# Patient Record
Sex: MALE | Race: WHITE | NOT HISPANIC OR LATINO | Employment: FULL TIME | URBAN - METROPOLITAN AREA
[De-identification: names, ages, dates, MRNs, and addresses within clinical notes are randomized per-mention and may not be internally consistent; named-entity substitution may affect disease eponyms.]

---

## 2017-02-15 ENCOUNTER — ALLSCRIPTS OFFICE VISIT (OUTPATIENT)
Dept: OTHER | Facility: OTHER | Age: 57
End: 2017-02-15

## 2017-02-15 DIAGNOSIS — I83.90 ASYMPTOMATIC VARICOSE VEINS OF LOWER EXTREMITY: ICD-10-CM

## 2017-02-15 DIAGNOSIS — R25.2 CRAMP AND SPASM: ICD-10-CM

## 2017-02-21 ENCOUNTER — HOSPITAL ENCOUNTER (OUTPATIENT)
Dept: RADIOLOGY | Facility: HOSPITAL | Age: 57
Discharge: HOME | End: 2017-02-21
Attending: FAMILY MEDICINE
Payer: COMMERCIAL

## 2017-02-21 DIAGNOSIS — R25.2 CRAMP AND SPASM: ICD-10-CM

## 2017-02-21 DIAGNOSIS — I83.90 ASYMPTOMATIC VARICOSE VEINS OF LOWER EXTREMITY: ICD-10-CM

## 2017-02-21 PROCEDURE — 93970 EXTREMITY STUDY: CPT

## 2017-02-23 ENCOUNTER — GENERIC CONVERSION - ENCOUNTER (OUTPATIENT)
Dept: OTHER | Facility: OTHER | Age: 57
End: 2017-02-23

## 2017-02-28 ENCOUNTER — ALLSCRIPTS OFFICE VISIT (OUTPATIENT)
Dept: OTHER | Facility: OTHER | Age: 57
End: 2017-02-28

## 2017-03-23 ENCOUNTER — ALLSCRIPTS OFFICE VISIT (OUTPATIENT)
Dept: OTHER | Facility: OTHER | Age: 57
End: 2017-03-23

## 2017-03-23 ENCOUNTER — TRANSCRIBE ORDERS (OUTPATIENT)
Dept: ADMINISTRATIVE | Facility: HOSPITAL | Age: 57
End: 2017-03-23

## 2017-03-23 DIAGNOSIS — R07.89 OTHER CHEST PAIN: ICD-10-CM

## 2017-03-23 DIAGNOSIS — R07.89 OTHER CHEST PAIN: Primary | ICD-10-CM

## 2017-04-04 ENCOUNTER — HOSPITAL ENCOUNTER (OUTPATIENT)
Dept: NON INVASIVE DIAGNOSTICS | Facility: HOSPITAL | Age: 57
Discharge: HOME | End: 2017-04-04
Attending: INTERNAL MEDICINE
Payer: COMMERCIAL

## 2017-04-04 ENCOUNTER — GENERIC CONVERSION - ENCOUNTER (OUTPATIENT)
Dept: OTHER | Facility: OTHER | Age: 57
End: 2017-04-04

## 2017-04-04 DIAGNOSIS — R07.89 OTHER CHEST PAIN: ICD-10-CM

## 2017-04-04 PROCEDURE — 93350 STRESS TTE ONLY: CPT

## 2017-04-05 LAB
MAX DIASTOLIC BP: 80 MMHG
MAX HEART RATE: 153 BPM
MAX PREDICTED HEART RATE: 165 BPM
MAX. SYSTOLIC BP: 160 MMHG
PROTOCOL NAME: NORMAL
TIME IN EXERCISE PHASE: 660 S

## 2017-05-08 LAB
A/G RATIO (HISTORICAL): 1.8 (ref 1.2–2.2)
ALBUMIN SERPL BCP-MCNC: 4.2 G/DL (ref 3.5–5.5)
ALP SERPL-CCNC: 59 IU/L (ref 39–117)
ALT SERPL W P-5'-P-CCNC: 21 IU/L (ref 0–44)
AST SERPL W P-5'-P-CCNC: 19 IU/L (ref 0–40)
BILIRUB SERPL-MCNC: 0.3 MG/DL (ref 0–1.2)
BUN SERPL-MCNC: 20 MG/DL (ref 6–24)
BUN/CREA RATIO (HISTORICAL): 18 (ref 9–20)
CALCIUM SERPL-MCNC: 9.3 MG/DL (ref 8.7–10.2)
CHLORIDE SERPL-SCNC: 101 MMOL/L (ref 96–106)
CHOLEST SERPL-MCNC: 163 MG/DL (ref 100–199)
CHOLEST/HDLC SERPL: 2.9 RATIO UNITS (ref 0–5)
CO2 SERPL-SCNC: 26 MMOL/L (ref 18–29)
CREAT SERPL-MCNC: 1.11 MG/DL (ref 0.76–1.27)
EGFR AFRICAN AMERICAN (HISTORICAL): 86 ML/MIN/1.73
EGFR-AMERICAN CALC (HISTORICAL): 74 ML/MIN/1.73
GLUCOSE SERPL-MCNC: 98 MG/DL (ref 65–99)
HDLC SERPL-MCNC: 56 MG/DL
LDLC SERPL CALC-MCNC: 87 MG/DL (ref 0–99)
POTASSIUM SERPL-SCNC: 4.9 MMOL/L (ref 3.5–5.2)
SODIUM SERPL-SCNC: 141 MMOL/L (ref 134–144)
TOT. GLOBULIN, SERUM (HISTORICAL): 2.3 G/DL (ref 1.5–4.5)
TOTAL PROTEIN (HISTORICAL): 6.5 G/DL (ref 6–8.5)
TRIGL SERPL-MCNC: 99 MG/DL (ref 0–149)
VLDLC SERPL CALC-MCNC: 20 MG/DL (ref 5–40)

## 2017-05-17 ENCOUNTER — ALLSCRIPTS OFFICE VISIT (OUTPATIENT)
Dept: OTHER | Facility: OTHER | Age: 57
End: 2017-05-17

## 2018-01-11 NOTE — PROGRESS NOTES
Assessment    1  Varicose veins of leg with pain, bilateral (454 8) (I83 813)   2  Chronic venous insufficiency (459 81) (I87 2)    Plan    1  Gradient compression stocking, below knee, 20-30mm Hg, each; Status:Complete;     Done: [id number]   2  Follow-up visit in 3 months Evaluation and Treatment  Follow-up  Status: Hold For -   Scheduling  Requested for: [id number]   3  Keep your leg elevated whenever you can to decrease swelling and increase circulation ;   Status:Complete;   Done: 11HJD2496   4  Support stockings can help keep the blood from pooling in the small veins in your feet   and legs ; Status:Complete;   Done: 50JFZ9490    Discussion/Summary  Discussion Summary:   Symptomatic varicose veins more severe on the left as compared to the right  We discussed at length the findings on recent reflux study as well as the general pathophysiology of venous disease to include venous hypertension and its long-term sequela  We discussed the treatment options available to include conservative management with compression, elevation, exercise and maintenance of a healthy weight  We also discussed the option of further evaluation and potential treatment in light of the combination of both deep and superficial venous insufficiency  At this point we will proceed with an initial conservative treatment regimen with follow-up evaluation in 3 months  He's been given a prescription for 20-30 mmHg knee-high compressive stockings  Chief Complaint  Chief Complaint Free Text Note Form: " I am here to have my veins looked at " [facility] 2/21/2017  Laura Fairchild is here today to have his varicose veins evaluated  PT states he has bilateral bulging veins , he states he has cramping in his calfs when resting at night  PT denies any swelling & achiness   PT denies any history of blood clots  PT does not wear compression stocking  PT is not on any blood thinners  PT is a nonsmoker        History of Present Illness  Free Text HPI: 15-year-old resents with bilateral lower extremity varicosities  He notes discomfort in both lower extremities which she describes mostly as a cramping sensation which occurs most frequently in his calves but also often in the medial aspect of both thighs  He notes this mostly at night  He spends the majority of his days on his feet  He does not utilize compressive stockings  He denies a history of deep or superficial venous thrombosis  PT states he has bilateral bulging veins , he states he has cramping in his calfs when resting at night  PT denies any swelling & achiness   PT denies any history of blood clots  PT does not wear compression stocking  PT is not on any blood thinners  PT is a nonsmoker  Review of Systems  Complete Male - Vasc:   Constitutional: No fever or chills, feels well, no tiredness, no recent weight gain or weight loss  Eyes: No sudden vision loss, no blurred vision, no double vision  ENT: no loss of hearing, no nosebleeds, no hoarseness  Cardiovascular: no chest pain, regular heart rate  Respiratory: No sob, no wheezing, no cough, no sob with exertion, no orthopnea  Gastrointestinal: No nausea, No vomiting, no diarrhea, no blood in stool  Genitourinary: no dysuria, no hematuria, No urinary incontinence, no erectile dysfunction  Musculoskeletal: no limb pain, no limb swelling  Integumentary: no rash, no lesions, no wounds, no ulcer  Neurological: no dementia, no headache, no numbness, no limb weakness, no dizziness, no difficulty walking  Psychiatric: no depression, no mood disorders, no anxiety  Hematologic/Lymphatic: no bleeding disorder, no easy bruising  ROS Reviewed:   ROS reviewed  Active Problems    1  Abdominal bloating (787 3) (R14 0)   2  Benign colon polyp (211 3) (K63 5)   3  BMI 29 0-29 9,adult (V85 25) (Z68 29)   4  Cervical radiculopathy (723 4) (M54 12)   5  Cramp of both lower extremities (729 82) (R25 2)   6  Internal hemorrhoids (455 0) (K64 8)   7  Lateral epicondylitis of right elbow (726 32) (M77 11)   8  Male erectile dysfunction, unspecified (607 84) (N52 9)   9  Muscle cramps (729 82) (R25 2)   10  Nonallopathic lesion (739 9) (M99 9)   11  Presbycusis (388 01) (H91 10)   12  Radiculopathy (729 2) (M54 10)   13  Spondylosis of cervical region without myelopathy or radiculopathy (721 0) (M47 812)   14  Tick bite (919 4,E906 4) (W57 XXXA)   15  Tinnitus (388 30) (H93 19)   16  Varicosities of leg (454 9) (I83 90)    Past Medical History    1  History of _   2  History of _   3  Cellulitis (682 9) (L03 90)   4  History of acute bronchitis (V12 69) (Z87 09)   5  History of basal cell carcinoma (V10 83) (Z85 828)   6  History of Lateral epicondylitis, unspecified laterality (726 32) (M77 10)   7  History of Tendonitis Peroneal (726 79)   8  History of Well adult on routine health check (V70 0) (Z00 00)  Active Problems And Past Medical History Reviewed: The active problems and past medical history were reviewed and updated today  Surgical History  Surgical History Reviewed: The surgical history was reviewed and updated today  Family History  Father    1  Family history of    2  Family history of Lung Cancer (V16 1)   3  Family history of Nicotine abuse  Maternal Grandfather    4  Family history of Acute Myocardial Infarction (V17 3)  Family History Reviewed: The family history was reviewed and updated today  Social History    · Alcohol Use (History)   · Daily Coffee Consumption (___ Cups/Day)   · Never A Smoker  Social History Reviewed: The social history was reviewed and updated today  Current Meds   1  Aleve 220 MG Oral Tablet; Take 1 tablet 3 times daily as needed Recorded   2  Viagra 100 MG Oral Tablet; TAKE 1 TABLET ONE HOUR PRIOR TO SEXUAL ACTIVITY; Therapy: 60FHR3373 to (Last Rx:[id number])  Requested for: 23RYN4260 Ordered  Medication List Reviewed: The medication list was reviewed and updated today  Allergies    1  No Known Drug Allergies    Vitals  Vital Signs    Recorded: 59Fka6894 03:03PM   Heart Rate 80, R Radial   Pulse Quality Normal, R Radial   Respiration Quality Normal   Respiration 18   Systolic 003, RUE, Sitting   Diastolic 70, RUE, Sitting   Height 6 ft    Weight 216 lb    BMI Calculated 29 3   BSA Calculated 2 2     Results/Data  Diagnostic Studies Reviewed Vasc:   Vascular Studies Reviewed: Venous reflux study from 2/21/17 with evidence of both deep and superficial venous incompetency  Physical Exam    Dorsalis pedis: right 2+ and left 2+  Distal Pulse Exam: Normal Capillary Refill  Extremities: bilateral ankle pitting edema, no pretibial edema and no foot edema  LE Varicose Veins: right leg 4-8+ truncal veins, left leg 4-8 millimeters+ truncal veins, no right leg reticular veins, no left leg reticular veins, right leg spider veins and left leg spider veins  Psychiatric   Orientation to person, place and time: Normal    Mood and affect: Normal    Neurologic   Motor skills intact  Musculoskeletal   Gait and station: Normal    Muscle strength/tone: Normal    Skin   Skin and subcutaneous tissue: Normal without rashes or lesions  Palpation of skin and subcutaneous tissue: Normal turgor     Venous Disease: No lipodermatosclerosis, stasis dermatitis, hyperpigmentation, or atrophie dillan noted on exam       Signatures   Electronically signed by : Melisa Oshea MD; Feb 28 2017  3:50PM EST                       (Author)

## 2018-01-12 VITALS
RESPIRATION RATE: 18 BRPM | HEART RATE: 80 BPM | WEIGHT: 216 LBS | HEIGHT: 72 IN | BODY MASS INDEX: 29.26 KG/M2 | DIASTOLIC BLOOD PRESSURE: 70 MMHG | SYSTOLIC BLOOD PRESSURE: 136 MMHG

## 2018-01-12 VITALS
DIASTOLIC BLOOD PRESSURE: 76 MMHG | HEART RATE: 82 BPM | WEIGHT: 217 LBS | RESPIRATION RATE: 16 BRPM | SYSTOLIC BLOOD PRESSURE: 130 MMHG | HEIGHT: 72 IN | BODY MASS INDEX: 29.39 KG/M2 | TEMPERATURE: 96 F

## 2018-01-12 VITALS
DIASTOLIC BLOOD PRESSURE: 80 MMHG | SYSTOLIC BLOOD PRESSURE: 124 MMHG | HEART RATE: 64 BPM | WEIGHT: 214.19 LBS | BODY MASS INDEX: 29.01 KG/M2 | HEIGHT: 72 IN | OXYGEN SATURATION: 99 %

## 2018-01-13 VITALS
HEIGHT: 72 IN | SYSTOLIC BLOOD PRESSURE: 116 MMHG | DIASTOLIC BLOOD PRESSURE: 70 MMHG | BODY MASS INDEX: 28.46 KG/M2 | WEIGHT: 210.13 LBS | HEART RATE: 60 BPM | OXYGEN SATURATION: 97 %

## 2018-01-18 NOTE — RESULT NOTES
Verified Results  VAS REFLUX LOWER LIMB    [id number] 09:42AM Melissa Marquez Order Number: [id number]    - Patient Instructions: To schedule this appointment, please contact Central Scheduling at [phone]  Test Name Result Flag Reference   VAS LOWER LIMB VENOUS DUPLEX STUDY WITH REFLUX ASSESSMENT, COMPLETE BILATERAL (Report)     [facility] REPORT   CLINICAL:   Indications: I83 90 Asymptomatic varicose veins of unspecified lower extremity   The patient has history of malignancy  He has no history of hypercoagulable   state, CAD, DVT or limb trauma  The patient current BMI is 30 85, Weight is 215   lb and Height is 70 in  Clinical: Bilateral varicose vein with symptoms        FINDINGS:      Right      Impression    AP(mm) Valve  Reflux Time    GSV Inguinal            12 0 Reflux   6 39785    GSV Prox Thigh           7 0 Reflux   5 97823    GSV Mid Thigh            5 0 Reflux   2 75651    GSV Dist Thigh           8 0 Reflux   2 74799    CFV       Normal (Patent)     Reflux   5 66691    GSV Knee              6 0 Reflux   2 39192    FV Prox                 Reflux   5 69197    GSV Prox Calf            6 0 Reflux   1 13823    GSV Mid Calf            3 0 Reflux   3 57973    GSV Dist Calf            4 0               PFV                   Reflux   4 83729    SSV Mid Calf            4 0 Reflux   4 13958    SSV Knee              2 0 Reflux   2 44746    SSV Ankle              2 0                  Left      Impression    AP(mm) Valve  Reflux Time    GSV Inguinal            9 0 Reflux   4 58096    GSV Prox Thigh           12 0 Reflux   6 83721    GSV Mid Thigh            5 0 Reflux   4 67488    GSV Dist Thigh           5 0 Reflux   3 46888    CFV       Normal (Patent)     Reflux   5 77036    GSV Knee              5 0 Reflux   3 85613    FV Prox                 Reflux   5 85983    GSV Prox Calf            4 0 Reflux   2 71555    GSV Mid Calf            3 0 Reflux   1 43541    GSV Dist Calf 3 0               GSV Ankle              3 0               Popliteal                Reflux   5 75427    SSV Mid Calf            2 0               SSV Knee              2 0               SSV Ankle              2 0                        CONCLUSION:      Impression:   RIGHT LIMB: Abnormal   Deep venous incompetence is noted t the common femoral vein with a valve   closure time (VCT) of 5 3 sec , femoral vein VCT 5 8 sec , deep femoral vein VCT   4 7 sec  The great saphenous vein is incompetent at the saphenofemoral junction with a   valve closure time of 6 seconds, proximal thigh with a VCT of 5 4 seconds, mid   thigh with a VCT of 2 1 seconds, and mid calf with a VCT of 3 4 seconds  The great saphenous vein leaves the fascial borders in the mid thigh and   measures 12 mm at the saphenofemoral junction, 7 mm proximal thigh, 5 mm mid   thigh, 8 mm distal thigh, 6 mm at the knee, and 6 mm BK  The small saphenous vein is incompetent and doesn't communicates with the   popliteal vein  small saphenous VCT proximal 2 9sec, mid VCT 4 9 sec  There is no evidence of incompetent perforators in the thigh or calf  There is no evidence of deep vein thrombosis in the CFV, the proximal PFV, the   femoral vein and the popliteal vein  LEFT LIMB: Abnormal   Deep venous incompetence is noted at the common femoral vein with a valve   closure time (VCT) of 5 7 sec, femoral vein VCT 5 8 sec , popliteal vein   VCT5 3sec  The great saphenous vein is incompetent at the saphenofemoral junction with a   valve closure time of 4 1 seconds, proximal thigh with a VCT of 6 5 seconds, mid   thigh with a VCT of 4 1 seconds, and mid calf with a VCT of 1 1 seconds  The great saphenous vein leaves the fascial borders in the mid thigh and   measures 9 mm at the saphenofemoral junction, 12 mm proximal thigh,5 mm mid   thigh,5 mm distal thigh, 5 mm at the knee, and 4 mm BK     The small saphenous vein is competent and doesn't communicates with the   popliteal vein  There is no evidence of deep vein thrombosis in the CFV, the proximal PFV, the   femoral vein and the popliteal vein  Study performed with patient in standing  SIGNATURE:   Electronically Signed by: Vincent Reddy on 2017-02-22 09:50:40 PM       Discussion/Summary   please call pt vasc study shows venous insuf b/l  [facility] - dr Walter Ceja - at appt    i would see him, will most probably need compression stockings

## 2018-03-09 ENCOUNTER — OFFICE VISIT (OUTPATIENT)
Dept: VASCULAR SURGERY | Facility: CLINIC | Age: 58
End: 2018-03-09
Payer: COMMERCIAL

## 2018-03-09 VITALS
DIASTOLIC BLOOD PRESSURE: 82 MMHG | SYSTOLIC BLOOD PRESSURE: 138 MMHG | TEMPERATURE: 97 F | BODY MASS INDEX: 28.44 KG/M2 | HEIGHT: 72 IN | RESPIRATION RATE: 16 BRPM | WEIGHT: 210 LBS | HEART RATE: 72 BPM

## 2018-03-09 DIAGNOSIS — I83.813 VARICOSE VEINS OF LEG WITH PAIN, BILATERAL: Primary | ICD-10-CM

## 2018-03-09 DIAGNOSIS — I87.2 CHRONIC VENOUS INSUFFICIENCY: ICD-10-CM

## 2018-03-09 PROCEDURE — 99213 OFFICE O/P EST LOW 20 MIN: CPT | Performed by: NURSE PRACTITIONER

## 2018-03-09 RX ORDER — NAPROXEN SODIUM 220 MG
1 TABLET ORAL 3 TIMES DAILY PRN
COMMUNITY

## 2018-03-09 RX ORDER — SILDENAFIL 100 MG/1
1 TABLET, FILM COATED ORAL AS NEEDED
COMMUNITY
Start: 2016-05-05 | End: 2021-12-06

## 2018-03-09 NOTE — PATIENT INSTRUCTIONS
Varicose veins bilateral legs with occasional right thigh cramping at night  -Your varicose veins appear asymptomatic at this time  You have no swelling, heaviness, aching, itching, or burning  However, the right thigh cramping you get intermittently could be a result of your varicose veins/chronic venous insufficiency  -I recommend that you wear your compression stockings on a daily basis or at least while you are at work    If your cramping is related to your varicose veins there should be some improvement in symptoms  -If your symptoms worsen or you develop swelling or chronic pain notify the office, otherwise we will be happy to see you on an as needed basis

## 2018-03-09 NOTE — PROGRESS NOTES
Assessment/Plan:    64 y/o healthy appearing M returns to office, at request of wife, for recheck of varicose veins  He has bilateral lower extremity varicose veins  Deep and superficial incompetence noted on reflux study in Feb 2017  He denies any edema or chronic pain  He is experiencing occasional cramping to the right thigh at night  Unclear if this is solely related to his varicose veins  He wears compression stockings intermittently, but has not noted any impact on symptoms  He has no skin changes, chronic pain or edema  I have advised more regular use of compression stockings  We also discussed the benefits of periodic elevation, regular physical activity and weight management  If his symptoms worsen or he develops new pain or edema he should notify the office, otherwise we will be happy to see him on an as needed basis  Diagnoses and all orders for this visit:    Varicose veins of leg with pain, bilateral    Chronic venous insufficiency    Other orders  -     naproxen sodium (ALEVE) 220 MG tablet; Take 1 tablet by mouth 3 (three) times a day as needed  -     sildenafil (VIAGRA) 100 mg tablet; Take 1 tablet by mouth          Subjective:      Patient ID: Loren Alvarado is a 65 y o  male  This patient returns to office for recheck of varicose veins  He states that his legs feel ok, though he does get occasional cramping only to the right thigh at night, but his wife wanted him to be checked  He has bilateral lower extremity varicose veins  LEVDR 2/2017 with note of both deep and superficial incompetence  No skin changes  Denies any achiness or swelling to the legs  Only complaint is of cramping at night that occurs only to the right thigh  He states that since his last visit he has started taking multi-vitamins and drinking more water  He wore compression socks regularly for about 3 months, but now not so frequently           The following portions of the patient's history were reviewed and updated as appropriate: allergies, current medications, past family history, past medical history, past social history, past surgical history and problem list     Review of Systems   Constitutional: Negative  HENT: Negative  Eyes: Negative  Respiratory: Negative  Cardiovascular: Positive for leg swelling  Endocrine: Negative  Genitourinary: Negative  Musculoskeletal: Negative  Skin: Negative  Allergic/Immunologic: Negative  Hematological: Negative  Psychiatric/Behavioral: Negative  Objective:     Physical Exam   Constitutional: He appears well-nourished  No distress  HENT:   Head: Normocephalic and atraumatic  Eyes: Conjunctivae are normal    Neck: Neck supple  No JVD present  Cardiovascular: Normal rate and regular rhythm  Pulses:       Dorsalis pedis pulses are 2+ on the right side, and 2+ on the left side  Posterior tibial pulses are 2+ on the right side, and 2+ on the left side  Truncal varicosities bilateral lower extremities, more pronounced on right   Pulmonary/Chest: Effort normal  No respiratory distress  Musculoskeletal: Normal range of motion  He exhibits no edema  Neurological: He is alert  Skin: Skin is warm and dry  Psychiatric: He has a normal mood and affect           Vitals:    03/09/18 1020   BP: 138/82   BP Location: Left arm   Patient Position: Sitting   Cuff Size: Adult   Pulse: 72   Resp: 16   Temp: (!) 97 °F (36 1 °C)   TempSrc: Tympanic   Weight: 95 3 kg (210 lb)   Height: 6' (1 829 m)       Patient Active Problem List   Diagnosis    Varicose veins of leg with pain, bilateral    Chronic venous insufficiency       Past Surgical History:   Procedure Laterality Date    SKIN BIOPSY         Family History   Problem Relation Age of Onset    Lung cancer Father     Heart attack Maternal Grandfather        Social History     Social History    Marital status: /Civil Union     Spouse name: N/A    Number of children: N/A  Years of education: N/A     Occupational History    Not on file       Social History Main Topics    Smoking status: Never Smoker    Smokeless tobacco: Never Used    Alcohol use Not on file    Drug use: Unknown    Sexual activity: Not on file     Other Topics Concern    Not on file     Social History Narrative    No narrative on file       No Known Allergies      Current Outpatient Prescriptions:     naproxen sodium (ALEVE) 220 MG tablet, Take 1 tablet by mouth 3 (three) times a day as needed, Disp: , Rfl:     sildenafil (VIAGRA) 100 mg tablet, Take 1 tablet by mouth, Disp: , Rfl:

## 2018-11-06 ENCOUNTER — OFFICE VISIT (OUTPATIENT)
Dept: FAMILY MEDICINE CLINIC | Facility: CLINIC | Age: 58
End: 2018-11-06
Payer: COMMERCIAL

## 2018-11-06 VITALS
WEIGHT: 209 LBS | HEART RATE: 72 BPM | RESPIRATION RATE: 16 BRPM | DIASTOLIC BLOOD PRESSURE: 84 MMHG | BODY MASS INDEX: 28.31 KG/M2 | HEIGHT: 72 IN | SYSTOLIC BLOOD PRESSURE: 130 MMHG | TEMPERATURE: 96.2 F

## 2018-11-06 DIAGNOSIS — K40.91 RECURRENT LEFT INGUINAL HERNIA: Primary | ICD-10-CM

## 2018-11-06 PROCEDURE — 99213 OFFICE O/P EST LOW 20 MIN: CPT | Performed by: FAMILY MEDICINE

## 2018-11-06 PROCEDURE — 3008F BODY MASS INDEX DOCD: CPT | Performed by: FAMILY MEDICINE

## 2018-11-06 PROCEDURE — 1036F TOBACCO NON-USER: CPT | Performed by: FAMILY MEDICINE

## 2018-11-06 NOTE — PROGRESS NOTES
Assessment/Plan:    Problem List Items Addressed This Visit     None      Visit Diagnoses     Recurrent left inguinal hernia    -  Primary    Relevant Orders    Ambulatory referral to General Surgery          There are no Patient Instructions on file for this visit  No Follow-up on file  Subjective:      Patient ID: Charline Avelar is a 63 y o  male  Chief Complaint   Patient presents with    lump     groin area, noticed 3 weeks ago        Pt states he has a lump in his lower abd, states he noticed it three weeks ago  May have been there longer  States yesterday it was large over weekend it was down to nothing  No pain  Pt was constipated one day  No diarrhea        The following portions of the patient's history were reviewed and updated as appropriate: allergies, current medications, past family history, past medical history, past social history, past surgical history and problem list     Review of Systems   Constitutional: Negative for activity change, appetite change, chills, diaphoresis, fatigue, fever and unexpected weight change  HENT: Negative for congestion, dental problem, ear pain, mouth sores, sinus pain, sinus pressure, sore throat and trouble swallowing  Eyes: Negative for photophobia, discharge and itching  Respiratory: Negative for apnea, chest tightness and shortness of breath  Cardiovascular: Negative for chest pain, palpitations and leg swelling  Gastrointestinal: Negative for abdominal distention, abdominal pain, blood in stool, nausea and vomiting  Swollen left inguinal area   Endocrine: Negative for cold intolerance, heat intolerance, polydipsia, polyphagia and polyuria  Genitourinary: Negative for difficulty urinating  Musculoskeletal: Negative for arthralgias  Skin: Negative for color change and wound  Neurological: Negative for dizziness, syncope, speech difficulty and headaches  Hematological: Negative for adenopathy     Psychiatric/Behavioral: Negative for agitation and behavioral problems  Current Outpatient Prescriptions   Medication Sig Dispense Refill    naproxen sodium (ALEVE) 220 MG tablet Take 1 tablet by mouth 3 (three) times a day as needed      sildenafil (VIAGRA) 100 mg tablet Take 1 tablet by mouth       No current facility-administered medications for this visit  Objective:    /84   Pulse 72   Temp (!) 96 2 °F (35 7 °C)   Resp 16   Ht 6' (1 829 m)   Wt 94 8 kg (209 lb)   BMI 28 35 kg/m²        Physical Exam   Constitutional: He appears well-developed and well-nourished  No distress  HENT:   Head: Normocephalic and atraumatic  Right Ear: External ear normal    Left Ear: External ear normal    Nose: Nose normal    Mouth/Throat: Oropharynx is clear and moist  No oropharyngeal exudate  Eyes: Pupils are equal, round, and reactive to light  EOM are normal  Right eye exhibits no discharge  Left eye exhibits no discharge  No scleral icterus  Neck: No thyromegaly present  Cardiovascular: Normal rate and normal heart sounds  No murmur heard  Pulmonary/Chest: Effort normal and breath sounds normal  No respiratory distress  He has no wheezes  Abdominal: Soft  Bowel sounds are normal  He exhibits no distension and no mass  There is no tenderness  There is no rebound and no guarding  Musculoskeletal: Normal range of motion  Neurological: He is alert  He displays normal reflexes  Coordination normal    Skin: Skin is warm and dry  No rash noted  He is not diaphoretic  No erythema  Psychiatric: He has a normal mood and affect  His behavior is normal    Nursing note and vitals reviewed               Elly Suárez DO

## 2019-03-27 ENCOUNTER — PREP FOR PROCEDURE (OUTPATIENT)
Dept: SURGERY | Facility: HOSPITAL | Age: 59
End: 2019-03-27

## 2019-03-27 ENCOUNTER — OFFICE VISIT (OUTPATIENT)
Dept: SURGERY | Facility: CLINIC | Age: 59
End: 2019-03-27
Payer: COMMERCIAL

## 2019-03-27 ENCOUNTER — TELEPHONE (OUTPATIENT)
Dept: SURGERY | Facility: HOSPITAL | Age: 59
End: 2019-03-27

## 2019-03-27 VITALS
BODY MASS INDEX: 28.44 KG/M2 | WEIGHT: 210 LBS | HEIGHT: 72 IN | SYSTOLIC BLOOD PRESSURE: 130 MMHG | DIASTOLIC BLOOD PRESSURE: 82 MMHG

## 2019-03-27 DIAGNOSIS — I83.813 VARICOSE VEINS OF LEG WITH PAIN, BILATERAL: ICD-10-CM

## 2019-03-27 DIAGNOSIS — K40.90 UNILATERAL INGUINAL HERNIA WITHOUT OBSTRUCTION OR GANGRENE, RECURRENCE NOT SPECIFIED: Primary | ICD-10-CM

## 2019-03-27 DIAGNOSIS — K40.90 LEFT GROIN HERNIA: Primary | ICD-10-CM

## 2019-03-27 DIAGNOSIS — R10.30 LOWER ABDOMINAL PAIN: ICD-10-CM

## 2019-03-27 DIAGNOSIS — K59.04 CHRONIC IDIOPATHIC CONSTIPATION: ICD-10-CM

## 2019-03-27 PROCEDURE — 99214 OFFICE O/P EST MOD 30 MIN: CPT | Performed by: SPECIALIST

## 2019-03-27 NOTE — LETTER
March 27, 2019     Thien Fermin DO  [address] [address]    Patient: Andrei Rabago   YOB: 1960   Date of Visit: 3/27/2019       Dear Dr Ritika Duong:    Thank you for referring Andrei Rabago to me for evaluation  Below are my notes for this consultation  If you have questions, please do not hesitate to call me  I look forward to following your patient along with you  Sincerely,        Sofía Chavez MD        CC: No Recipients  Sofía Chavez MD  3/27/2019  9:19 AM  Signed  [facility] Pre Procedure/ Admission History and Physical Note:  Andrei Rabago 63 y o  male MRN: [id number]  Encounter: [id number] PCP: Thien Fermin DO      Assessment/Plan:    Left groin hernia  Left groin hernia with some pain and constipation    Chronic idiopathic constipation  Colonoscopy negative  For hernia surgery    Lower abdominal pain  Advice hernia surgery Risk and benefits were explained       Diagnoses and all orders for this visit:    Left groin hernia    Chronic idiopathic constipation    Varicose veins of leg with pain, bilateral    Lower abdominal pain          Subjective:      Patient ID: Andrei Rabago is a 63 y o  male      HPI    left groin pain and the swelling which has increased in size patient works as  and is causing him problems now as it is increasing in size  Referred to us for hernia surgery  Patient's history of chronic constipation as all the workup and colonoscopy done which was unremarkable not on any laxative bowel movements have reached 2nd 3rd day some straining which may have caused the hernia  No bleeding no cough no expectoration nonsmoker no history of any urinary complaint    The following portions of the patient's history were reviewed and updated as appropriate: He  has a past medical history of Basal cell carcinoma, Cervical radiculopathy, Lateral epicondylitis (05/04/2005), Peroneal tendonitis (10/29/2007), Radiculopathy, and Varicosities of leg  He   Patient Active Problem List    Diagnosis Date Noted    Left groin hernia 03/27/2019    Chronic idiopathic constipation 03/27/2019    Lower abdominal pain 03/27/2019    Varicose veins of leg with pain, bilateral 02/28/2017    Chronic venous insufficiency 02/28/2017     He  has a past surgical history that includes Skin biopsy and Colonoscopy  His family history includes Heart attack in his maternal grandfather; Hypertension in his father; Lung cancer in his father; Other in his father  He  reports that he has never smoked  He has never used smokeless tobacco  He reports that he drinks alcohol  His drug history is not on file  Current Outpatient Medications   Medication Sig Dispense Refill    naproxen sodium (ALEVE) 220 MG tablet Take 1 tablet by mouth 3 (three) times a day as needed      sildenafil (VIAGRA) 100 mg tablet Take 1 tablet by mouth       No current facility-administered medications for this visit  Current Outpatient Medications on File Prior to Visit   Medication Sig    naproxen sodium (ALEVE) 220 MG tablet Take 1 tablet by mouth 3 (three) times a day as needed    sildenafil (VIAGRA) 100 mg tablet Take 1 tablet by mouth     No current facility-administered medications on file prior to visit  He has No Known Allergies       Review of Systems   Constitutional: Negative  HENT: Negative for congestion, dental problem and drooling  Eyes: Negative for discharge and itching  Respiratory: Negative  Negative for apnea, cough, choking, chest tightness, shortness of breath, wheezing and stridor  Cardiovascular: Negative  Gastrointestinal: Positive for constipation  Negative for abdominal distention, abdominal pain, anal bleeding, blood in stool, diarrhea, nausea, rectal pain and vomiting          Groin swelling which is increasing in size and some groin discomfort left groin   Endocrine: Negative for cold intolerance, heat intolerance, polydipsia, polyphagia and polyuria  Genitourinary: Negative for difficulty urinating, dysuria, enuresis, flank pain, frequency, genital sores and scrotal swelling  Musculoskeletal: Negative for arthralgias, back pain, gait problem, joint swelling and myalgias  Allergic/Immunologic: Negative for environmental allergies  Neurological: Negative for dizziness, facial asymmetry, light-headedness and headaches  Hematological: Negative for adenopathy  Does not bruise/bleed easily  Psychiatric/Behavioral: Negative for agitation, behavioral problems and confusion  Objective:    /82 (BP Location: Left arm, Patient Position: Sitting)   Ht 6' (1 829 m)   Wt 95 3 kg (210 lb)   BMI 28 48 kg/m²       Physical Exam   Constitutional: He is oriented to person, place, and time  He appears well-developed and well-nourished  HENT:   Head: Normocephalic and atraumatic  Eyes: Pupils are equal, round, and reactive to light  Conjunctivae and EOM are normal    Neck: Normal range of motion  Neck supple  No JVD present  No tracheal deviation present  No thyromegaly present  Cardiovascular: Normal rate, regular rhythm, normal heart sounds and intact distal pulses  Pulmonary/Chest: Effort normal and breath sounds normal    Abdominal: Soft  Bowel sounds are normal  A hernia is present  Left groin swelling partially reducible some pain otherwise nontender   Musculoskeletal: Normal range of motion  He exhibits no edema or deformity  Lymphadenopathy:     He has no cervical adenopathy  Neurological: He is alert and oriented to person, place, and time  Skin: Skin is warm and dry  Psychiatric: He has a normal mood and affect  His behavior is normal  Judgment and thought content normal    Vitals reviewed  Pertinent labs reviewed  Most Recent Labs:   No visits with results within 2 Week(s) from this visit     Latest known visit with results is:   Hospital Outpatient Visit on 04/04/2017   Component Date Value Ref Range Status    Protocol Name 04/04/2017 [facility]              Final    Time In Exercise Phase 04/04/2017 660  S Final    MAX  SYSTOLIC BP 73/07/8956 729  mmHg Final    Max Diastolic Bp 78/20/8407 80  mmHg Final    Max Heart Rate 04/04/2017 153  BPM Final    Max Predicted Heart Rate 04/04/2017 165  BPM Final       Pertinent images and available reads personally reviewed  No results found  Pertinent notes reviewed  Proper consent was obtained  The risks, benefits, alternatives,and probabilities of success were discussed in detail with no guarantee made as to outcome  All questions were answered to the patient's satisfaction  Preoperative prep was explained to the patient  Will repeat the blood work CBC CMP EKG prior to surgery    ?   Dr Sofía Chavez MD  [address] [facility]  [facility]  [phone]

## 2019-03-27 NOTE — PROGRESS NOTES
[facility] Pre Procedure/ Admission History and Physical Note:  Sanchez Villanueva 63 y o  male MRN: [id number]  Encounter: [id number] PCP: Shaun Stratton DO      Assessment/Plan:    Left groin hernia  Left groin hernia with some pain and constipation    Chronic idiopathic constipation  Colonoscopy negative  For hernia surgery    Lower abdominal pain  Advice hernia surgery Risk and benefits were explained       Diagnoses and all orders for this visit:    Left groin hernia    Chronic idiopathic constipation    Varicose veins of leg with pain, bilateral    Lower abdominal pain          Subjective:      Patient ID: Sanchez Villanueva is a 63 y o  male  HPI    left groin pain and the swelling which has increased in size patient works as  and is causing him problems now as it is increasing in size  Referred to us for hernia surgery  Patient's history of chronic constipation as all the workup and colonoscopy done which was unremarkable not on any laxative bowel movements have reached 2nd 3rd day some straining which may have caused the hernia  No bleeding no cough no expectoration nonsmoker no history of any urinary complaint    The following portions of the patient's history were reviewed and updated as appropriate: He  has a past medical history of Basal cell carcinoma, Cervical radiculopathy, Lateral epicondylitis (05/04/2005), Peroneal tendonitis (10/29/2007), Radiculopathy, and Varicosities of leg  He   Patient Active Problem List    Diagnosis Date Noted    Left groin hernia 03/27/2019    Chronic idiopathic constipation 03/27/2019    Lower abdominal pain 03/27/2019    Varicose veins of leg with pain, bilateral 02/28/2017    Chronic venous insufficiency 02/28/2017     He  has a past surgical history that includes Skin biopsy and Colonoscopy  His family history includes Heart attack in his maternal grandfather; Hypertension in his father; Lung cancer in his father;  Other in his father  He  reports that he has never smoked  He has never used smokeless tobacco  He reports that he drinks alcohol  His drug history is not on file  Current Outpatient Medications   Medication Sig Dispense Refill    naproxen sodium (ALEVE) 220 MG tablet Take 1 tablet by mouth 3 (three) times a day as needed      sildenafil (VIAGRA) 100 mg tablet Take 1 tablet by mouth       No current facility-administered medications for this visit  Current Outpatient Medications on File Prior to Visit   Medication Sig    naproxen sodium (ALEVE) 220 MG tablet Take 1 tablet by mouth 3 (three) times a day as needed    sildenafil (VIAGRA) 100 mg tablet Take 1 tablet by mouth     No current facility-administered medications on file prior to visit  He has No Known Allergies       Review of Systems   Constitutional: Negative  HENT: Negative for congestion, dental problem and drooling  Eyes: Negative for discharge and itching  Respiratory: Negative  Negative for apnea, cough, choking, chest tightness, shortness of breath, wheezing and stridor  Cardiovascular: Negative  Gastrointestinal: Positive for constipation  Negative for abdominal distention, abdominal pain, anal bleeding, blood in stool, diarrhea, nausea, rectal pain and vomiting  Groin swelling which is increasing in size and some groin discomfort left groin   Endocrine: Negative for cold intolerance, heat intolerance, polydipsia, polyphagia and polyuria  Genitourinary: Negative for difficulty urinating, dysuria, enuresis, flank pain, frequency, genital sores and scrotal swelling  Musculoskeletal: Negative for arthralgias, back pain, gait problem, joint swelling and myalgias  Allergic/Immunologic: Negative for environmental allergies  Neurological: Negative for dizziness, facial asymmetry, light-headedness and headaches  Hematological: Negative for adenopathy  Does not bruise/bleed easily     Psychiatric/Behavioral: Negative for agitation, behavioral problems and confusion  Objective:    /82 (BP Location: Left arm, Patient Position: Sitting)   Ht 6' (1 829 m)   Wt 95 3 kg (210 lb)   BMI 28 48 kg/m²      Physical Exam   Constitutional: He is oriented to person, place, and time  He appears well-developed and well-nourished  HENT:   Head: Normocephalic and atraumatic  Eyes: Pupils are equal, round, and reactive to light  Conjunctivae and EOM are normal    Neck: Normal range of motion  Neck supple  No JVD present  No tracheal deviation present  No thyromegaly present  Cardiovascular: Normal rate, regular rhythm, normal heart sounds and intact distal pulses  Pulmonary/Chest: Effort normal and breath sounds normal    Abdominal: Soft  Bowel sounds are normal  A hernia is present  Left groin swelling partially reducible some pain otherwise nontender   Musculoskeletal: Normal range of motion  He exhibits no edema or deformity  Lymphadenopathy:     He has no cervical adenopathy  Neurological: He is alert and oriented to person, place, and time  Skin: Skin is warm and dry  Psychiatric: He has a normal mood and affect  His behavior is normal  Judgment and thought content normal    Vitals reviewed  Pertinent labs reviewed  Most Recent Labs:   No visits with results within 2 Week(s) from this visit  Latest known visit with results is:   Hospital Outpatient Visit on 04/04/2017   Component Date Value Ref Range Status    Protocol Name 04/04/2017 [facility]              Final    Time In Exercise Phase 04/04/2017 660  S Final    MAX  SYSTOLIC BP 89/86/7439 563  mmHg Final    Max Diastolic Bp 53/35/1439 80  mmHg Final    Max Heart Rate 04/04/2017 153  BPM Final    Max Predicted Heart Rate 04/04/2017 165  BPM Final       Pertinent images and available reads personally reviewed  No results found  Pertinent notes reviewed  Proper consent was obtained  The risks, benefits, alternatives,and probabilities of success were discussed in detail with no guarantee made as to outcome  All questions were answered to the patient's satisfaction  Preoperative prep was explained to the patient  Will repeat the blood work CBC CMP EKG prior to surgery    ?   Dr Ankush Francis MD  [address] [facility]  [facility]  [phone]

## 2019-03-27 NOTE — PATIENT INSTRUCTIONS
[address] [place] [address] [facility] Pre Procedure/ Preprocedure instructions  Padmini Cage 63 y o  male MRN: [id number]  Encounter: [id number] PCP: Toya Wilson, DO        Preoperative instructions for patient going for major abdominal surgery    Please come fasting from midnight for the surgery    Advice light meal night before surgery    Take shower night before surgery and in the morning before coming to the hospital and apply Hebicleans antiseptic soap or lotion  To the site for operation area to prevent the postop surgical wound infection    Stop taking anticoagulation medicine  Plavix and aspirin 7 days prior to surgery  Coumadin 2 days prior to surgery  Xarelto /Eliquis / Pradaxa 72 hours after prior to service    Stop taking diabetic medicine long-acting insulin or oral diabetic medicine night before surgery and the morning dose before surgery  If you take these medications you will get hypoglycemia in the morning as you are fasting for surgery    For large bowel surgery for colon cancer and diverticulitis  Take bowel prep if you are having:  Colonic surgery for colon cancer or diverticulitis    As prescribed   GoLYTELY/ Dulcolax prepped day before surgery start 8 a m  in the morning and drink glass every 15 minutes of bowel prep it should be be completed before 3:00 p m  You can't place GoLYTELY in the refrigerator night before make it more palatable    Take antibiotics after 3:00 p m  For bowel surgery Neomycin 500 mg 2 capsules at 3:00 p m  4:00 p m  And 6:00 p m  Take antibiotics after 3:00 p m  For bowel surgery Flagyl 500 mg 2 tablets at 3:00 p m  4 00 p m  And 6:00 p m  This is to sterilize your gut from inside for bowel surgery  And it has to be taken after completion of bowel prep    Take all your regular morning medicine specially heart and blood pressure medicine other than mentioned above with a sip of water in the morning      Pre-Surgery Instructions:   Medication Instructions    naproxen sodium (ALEVE) 220 MG tablet Take morning of surgery    sildenafil (VIAGRA) 100 mg tablet Take morning of surgery       Dr Alexander Dougherty MD  MS FRCS FACS  [facility]  [phone]

## 2019-03-27 NOTE — TELEPHONE ENCOUNTER
Pt is set up for hernia repair with Dr Purvi Yanez on 4/9/2019  Consent scanned in  Pt verblized all understanding and all question answered  Post appointment made for two weeks

## 2019-04-04 DIAGNOSIS — I83.813 VARICOSE VEINS OF LEG WITH PAIN, BILATERAL: ICD-10-CM

## 2019-04-04 DIAGNOSIS — K40.90 LEFT GROIN HERNIA: ICD-10-CM

## 2019-04-04 DIAGNOSIS — R10.30 LOWER ABDOMINAL PAIN: ICD-10-CM

## 2019-04-04 DIAGNOSIS — K59.04 CHRONIC IDIOPATHIC CONSTIPATION: ICD-10-CM

## 2019-04-05 ENCOUNTER — APPOINTMENT (OUTPATIENT)
Dept: PREADMISSION TESTING | Facility: HOSPITAL | Age: 59
End: 2019-04-05
Payer: COMMERCIAL

## 2019-04-05 ENCOUNTER — OFFICE VISIT (OUTPATIENT)
Dept: LAB | Facility: HOSPITAL | Age: 59
End: 2019-04-05
Attending: SPECIALIST
Payer: COMMERCIAL

## 2019-04-05 ENCOUNTER — APPOINTMENT (OUTPATIENT)
Dept: LAB | Facility: HOSPITAL | Age: 59
End: 2019-04-05
Attending: SPECIALIST
Payer: COMMERCIAL

## 2019-04-05 ENCOUNTER — TRANSCRIBE ORDERS (OUTPATIENT)
Dept: ADMINISTRATIVE | Facility: HOSPITAL | Age: 59
End: 2019-04-05

## 2019-04-05 DIAGNOSIS — K40.90 LEFT GROIN HERNIA: ICD-10-CM

## 2019-04-05 LAB
ANION GAP SERPL CALCULATED.3IONS-SCNC: 4 MMOL/L (ref 4–13)
BASOPHILS # BLD AUTO: 0.03 THOUSANDS/ΜL (ref 0–0.1)
BASOPHILS NFR BLD AUTO: 1 % (ref 0–1)
BUN SERPL-MCNC: 17 MG/DL (ref 5–25)
CALCIUM SERPL-MCNC: 9.1 MG/DL (ref 8.3–10.1)
CHLORIDE SERPL-SCNC: 104 MMOL/L (ref 100–108)
CO2 SERPL-SCNC: 30 MMOL/L (ref 21–32)
CREAT SERPL-MCNC: 1.15 MG/DL (ref 0.6–1.3)
EOSINOPHIL # BLD AUTO: 0.11 THOUSAND/ΜL (ref 0–0.61)
EOSINOPHIL NFR BLD AUTO: 2 % (ref 0–6)
ERYTHROCYTE [DISTWIDTH] IN BLOOD BY AUTOMATED COUNT: 13.3 % (ref 11.6–15.1)
GFR SERPL CREATININE-BSD FRML MDRD: 70 ML/MIN/1.73SQ M
GLUCOSE P FAST SERPL-MCNC: 95 MG/DL (ref 65–99)
HCT VFR BLD AUTO: 45.5 % (ref 36.5–49.3)
HGB BLD-MCNC: 14.5 G/DL (ref 12–17)
IMM GRANULOCYTES # BLD AUTO: 0.02 THOUSAND/UL (ref 0–0.2)
IMM GRANULOCYTES NFR BLD AUTO: 0 % (ref 0–2)
LYMPHOCYTES # BLD AUTO: 1.21 THOUSANDS/ΜL (ref 0.6–4.47)
LYMPHOCYTES NFR BLD AUTO: 27 % (ref 14–44)
MCH RBC QN AUTO: 29.4 PG (ref 26.8–34.3)
MCHC RBC AUTO-ENTMCNC: 31.9 G/DL (ref 31.4–37.4)
MCV RBC AUTO: 92 FL (ref 82–98)
MONOCYTES # BLD AUTO: 0.44 THOUSAND/ΜL (ref 0.17–1.22)
MONOCYTES NFR BLD AUTO: 10 % (ref 4–12)
NEUTROPHILS # BLD AUTO: 2.69 THOUSANDS/ΜL (ref 1.85–7.62)
NEUTS SEG NFR BLD AUTO: 60 % (ref 43–75)
NRBC BLD AUTO-RTO: 0 /100 WBCS
PLATELET # BLD AUTO: 234 THOUSANDS/UL (ref 149–390)
PMV BLD AUTO: 9.9 FL (ref 8.9–12.7)
POTASSIUM SERPL-SCNC: 5 MMOL/L (ref 3.5–5.3)
RBC # BLD AUTO: 4.93 MILLION/UL (ref 3.88–5.62)
SODIUM SERPL-SCNC: 138 MMOL/L (ref 136–145)
WBC # BLD AUTO: 4.5 THOUSAND/UL (ref 4.31–10.16)

## 2019-04-05 PROCEDURE — 85025 COMPLETE CBC W/AUTO DIFF WBC: CPT

## 2019-04-05 PROCEDURE — 80048 BASIC METABOLIC PNL TOTAL CA: CPT

## 2019-04-05 PROCEDURE — 36415 COLL VENOUS BLD VENIPUNCTURE: CPT

## 2019-04-05 PROCEDURE — 93005 ELECTROCARDIOGRAM TRACING: CPT

## 2019-04-05 RX ORDER — DIPHENOXYLATE HYDROCHLORIDE AND ATROPINE SULFATE 2.5; .025 MG/1; MG/1
1 TABLET ORAL DAILY
COMMUNITY

## 2019-04-05 RX ORDER — ACETAMINOPHEN 500 MG
500 TABLET ORAL EVERY 6 HOURS PRN
COMMUNITY

## 2019-04-08 ENCOUNTER — ANESTHESIA EVENT (OUTPATIENT)
Dept: PERIOP | Facility: HOSPITAL | Age: 59
End: 2019-04-08
Payer: COMMERCIAL

## 2019-04-08 LAB
ATRIAL RATE: 63 BPM
P AXIS: 46 DEGREES
PR INTERVAL: 192 MS
QRS AXIS: 15 DEGREES
QRSD INTERVAL: 94 MS
QT INTERVAL: 410 MS
QTC INTERVAL: 419 MS
T WAVE AXIS: 43 DEGREES
VENTRICULAR RATE: 63 BPM

## 2019-04-08 PROCEDURE — 93010 ELECTROCARDIOGRAM REPORT: CPT | Performed by: INTERNAL MEDICINE

## 2019-04-09 ENCOUNTER — ANESTHESIA (OUTPATIENT)
Dept: PERIOP | Facility: HOSPITAL | Age: 59
End: 2019-04-09
Payer: COMMERCIAL

## 2019-04-09 ENCOUNTER — HOSPITAL ENCOUNTER (OUTPATIENT)
Facility: HOSPITAL | Age: 59
Setting detail: OUTPATIENT SURGERY
Discharge: HOME | End: 2019-04-09
Attending: SPECIALIST | Admitting: SPECIALIST
Payer: COMMERCIAL

## 2019-04-09 VITALS
RESPIRATION RATE: 16 BRPM | BODY MASS INDEX: 27.14 KG/M2 | TEMPERATURE: 97.1 F | WEIGHT: 200.38 LBS | SYSTOLIC BLOOD PRESSURE: 122 MMHG | DIASTOLIC BLOOD PRESSURE: 72 MMHG | HEART RATE: 66 BPM | OXYGEN SATURATION: 98 % | HEIGHT: 72 IN

## 2019-04-09 DIAGNOSIS — K40.90 LEFT GROIN HERNIA: ICD-10-CM

## 2019-04-09 DIAGNOSIS — K40.90 NON-RECURRENT UNILATERAL INGUINAL HERNIA WITHOUT OBSTRUCTION OR GANGRENE: Primary | ICD-10-CM

## 2019-04-09 PROCEDURE — C1781 MESH (IMPLANTABLE): HCPCS | Performed by: SPECIALIST

## 2019-04-09 PROCEDURE — 49505 PRP I/HERN INIT REDUC >5 YR: CPT | Performed by: SPECIALIST

## 2019-04-09 DEVICE — BARD MESH PERFIX PLUG, LARGE
Type: IMPLANTABLE DEVICE | Site: GROIN | Status: FUNCTIONAL
Brand: BARD MESH PERFIX PLUG

## 2019-04-09 RX ORDER — ONDANSETRON 2 MG/ML
4 INJECTION INTRAMUSCULAR; INTRAVENOUS ONCE AS NEEDED
Status: DISCONTINUED | OUTPATIENT
Start: 2019-04-09 | End: 2019-04-09 | Stop reason: HOSPADM

## 2019-04-09 RX ORDER — SODIUM CHLORIDE 9 MG/ML
125 INJECTION, SOLUTION INTRAVENOUS CONTINUOUS
Status: DISCONTINUED | OUTPATIENT
Start: 2019-04-09 | End: 2019-04-09 | Stop reason: HOSPADM

## 2019-04-09 RX ORDER — ONDANSETRON 2 MG/ML
INJECTION INTRAMUSCULAR; INTRAVENOUS AS NEEDED
Status: DISCONTINUED | OUTPATIENT
Start: 2019-04-09 | End: 2019-04-09 | Stop reason: SURG

## 2019-04-09 RX ORDER — MIDAZOLAM HYDROCHLORIDE 1 MG/ML
INJECTION INTRAMUSCULAR; INTRAVENOUS AS NEEDED
Status: DISCONTINUED | OUTPATIENT
Start: 2019-04-09 | End: 2019-04-09 | Stop reason: SURG

## 2019-04-09 RX ORDER — SODIUM CHLORIDE 9 MG/ML
INJECTION, SOLUTION INTRAVENOUS CONTINUOUS PRN
Status: DISCONTINUED | OUTPATIENT
Start: 2019-04-09 | End: 2019-04-09 | Stop reason: SURG

## 2019-04-09 RX ORDER — FENTANYL CITRATE 50 UG/ML
INJECTION, SOLUTION INTRAMUSCULAR; INTRAVENOUS AS NEEDED
Status: DISCONTINUED | OUTPATIENT
Start: 2019-04-09 | End: 2019-04-09 | Stop reason: SURG

## 2019-04-09 RX ORDER — OXYCODONE HYDROCHLORIDE AND ACETAMINOPHEN 5; 325 MG/1; MG/1
1 TABLET ORAL EVERY 4 HOURS PRN
Qty: 12 TABLET | Refills: 0 | Status: SHIPPED | OUTPATIENT
Start: 2019-04-09 | End: 2019-04-12

## 2019-04-09 RX ORDER — FENTANYL CITRATE/PF 50 MCG/ML
25 SYRINGE (ML) INJECTION
Status: DISCONTINUED | OUTPATIENT
Start: 2019-04-09 | End: 2019-04-09 | Stop reason: HOSPADM

## 2019-04-09 RX ORDER — CEFAZOLIN SODIUM 2 G/50ML
2000 SOLUTION INTRAVENOUS
Status: COMPLETED | OUTPATIENT
Start: 2019-04-09 | End: 2019-04-09

## 2019-04-09 RX ORDER — PROPOFOL 10 MG/ML
INJECTION, EMULSION INTRAVENOUS AS NEEDED
Status: DISCONTINUED | OUTPATIENT
Start: 2019-04-09 | End: 2019-04-09 | Stop reason: SURG

## 2019-04-09 RX ORDER — DEXAMETHASONE SODIUM PHOSPHATE 10 MG/ML
INJECTION, SOLUTION INTRAMUSCULAR; INTRAVENOUS AS NEEDED
Status: DISCONTINUED | OUTPATIENT
Start: 2019-04-09 | End: 2019-04-09 | Stop reason: SURG

## 2019-04-09 RX ORDER — BUPIVACAINE HYDROCHLORIDE AND EPINEPHRINE 5; 5 MG/ML; UG/ML
INJECTION, SOLUTION PERINEURAL AS NEEDED
Status: DISCONTINUED | OUTPATIENT
Start: 2019-04-09 | End: 2019-04-09 | Stop reason: HOSPADM

## 2019-04-09 RX ADMIN — FENTANYL CITRATE 25 MCG: 50 INJECTION, SOLUTION INTRAMUSCULAR; INTRAVENOUS at 10:07

## 2019-04-09 RX ADMIN — SODIUM CHLORIDE 125 ML/HR: 0.9 INJECTION, SOLUTION INTRAVENOUS at 06:56

## 2019-04-09 RX ADMIN — ONDANSETRON 4 MG: 2 INJECTION INTRAMUSCULAR; INTRAVENOUS at 08:01

## 2019-04-09 RX ADMIN — FENTANYL CITRATE 25 MCG: 50 INJECTION, SOLUTION INTRAMUSCULAR; INTRAVENOUS at 10:19

## 2019-04-09 RX ADMIN — CEFAZOLIN SODIUM 2000 MG: 2 SOLUTION INTRAVENOUS at 07:53

## 2019-04-09 RX ADMIN — PROPOFOL 200 MG: 10 INJECTION, EMULSION INTRAVENOUS at 07:57

## 2019-04-09 RX ADMIN — MIDAZOLAM HYDROCHLORIDE 2 MG: 1 INJECTION, SOLUTION INTRAMUSCULAR; INTRAVENOUS at 07:53

## 2019-04-09 RX ADMIN — SODIUM CHLORIDE: 0.9 INJECTION, SOLUTION INTRAVENOUS at 07:53

## 2019-04-09 RX ADMIN — FENTANYL CITRATE 50 MCG: 50 INJECTION, SOLUTION INTRAMUSCULAR; INTRAVENOUS at 07:57

## 2019-04-09 RX ADMIN — DEXAMETHASONE SODIUM PHOSPHATE 4 MG: 10 INJECTION, SOLUTION INTRAMUSCULAR; INTRAVENOUS at 08:01

## 2019-04-09 RX ADMIN — SODIUM CHLORIDE 125 ML/HR: 0.9 INJECTION, SOLUTION INTRAVENOUS at 09:56

## 2019-04-09 RX ADMIN — LIDOCAINE HYDROCHLORIDE 100 MG: 20 INJECTION, SOLUTION INTRAVENOUS at 07:57

## 2019-04-23 ENCOUNTER — OFFICE VISIT (OUTPATIENT)
Dept: SURGERY | Facility: CLINIC | Age: 59
End: 2019-04-23

## 2019-04-23 VITALS
BODY MASS INDEX: 27.09 KG/M2 | TEMPERATURE: 97.7 F | DIASTOLIC BLOOD PRESSURE: 74 MMHG | WEIGHT: 200 LBS | HEIGHT: 72 IN | SYSTOLIC BLOOD PRESSURE: 136 MMHG

## 2019-04-23 DIAGNOSIS — Z09 FOLLOW-UP SURGERY CARE: Primary | ICD-10-CM

## 2019-04-23 PROCEDURE — 99024 POSTOP FOLLOW-UP VISIT: CPT | Performed by: SPECIALIST

## 2019-05-31 ENCOUNTER — OFFICE VISIT (OUTPATIENT)
Dept: VASCULAR SURGERY | Facility: CLINIC | Age: 59
End: 2019-05-31
Payer: COMMERCIAL

## 2019-05-31 VITALS
RESPIRATION RATE: 16 BRPM | HEART RATE: 76 BPM | BODY MASS INDEX: 28.58 KG/M2 | WEIGHT: 211 LBS | TEMPERATURE: 97.5 F | HEIGHT: 72 IN | SYSTOLIC BLOOD PRESSURE: 122 MMHG | DIASTOLIC BLOOD PRESSURE: 74 MMHG

## 2019-05-31 DIAGNOSIS — I80.02 PHLEBITIS AND THROMBOPHLEBITIS OF SUPERFICIAL VESSELS OF LEFT LOWER EXTREMITY: ICD-10-CM

## 2019-05-31 DIAGNOSIS — I87.2 VENOUS INSUFFICIENCY OF BOTH LOWER EXTREMITIES: ICD-10-CM

## 2019-05-31 DIAGNOSIS — I83.813 VARICOSE VEINS OF LEG WITH PAIN, BILATERAL: Primary | ICD-10-CM

## 2019-05-31 PROCEDURE — 99214 OFFICE O/P EST MOD 30 MIN: CPT | Performed by: PHYSICIAN ASSISTANT

## 2019-06-11 ENCOUNTER — HOSPITAL ENCOUNTER (OUTPATIENT)
Dept: RADIOLOGY | Facility: HOSPITAL | Age: 59
Discharge: HOME | End: 2019-06-11
Payer: COMMERCIAL

## 2019-06-11 ENCOUNTER — TELEPHONE (OUTPATIENT)
Dept: VASCULAR SURGERY | Facility: CLINIC | Age: 59
End: 2019-06-11

## 2019-06-11 DIAGNOSIS — I83.813 VARICOSE VEINS OF LEG WITH PAIN, BILATERAL: ICD-10-CM

## 2019-06-11 DIAGNOSIS — I80.02 PHLEBITIS AND THROMBOPHLEBITIS OF SUPERFICIAL VESSELS OF LEFT LOWER EXTREMITY: ICD-10-CM

## 2019-06-11 DIAGNOSIS — I87.2 VENOUS INSUFFICIENCY OF BOTH LOWER EXTREMITIES: ICD-10-CM

## 2019-06-11 PROCEDURE — 93970 EXTREMITY STUDY: CPT

## 2019-06-14 PROCEDURE — 93970 EXTREMITY STUDY: CPT | Performed by: SURGERY

## 2019-10-18 ENCOUNTER — OFFICE VISIT (OUTPATIENT)
Dept: VASCULAR SURGERY | Facility: CLINIC | Age: 59
End: 2019-10-18
Payer: COMMERCIAL

## 2019-10-18 VITALS
RESPIRATION RATE: 16 BRPM | WEIGHT: 204 LBS | SYSTOLIC BLOOD PRESSURE: 124 MMHG | DIASTOLIC BLOOD PRESSURE: 76 MMHG | HEART RATE: 68 BPM | TEMPERATURE: 97.9 F | BODY MASS INDEX: 27.63 KG/M2 | HEIGHT: 72 IN

## 2019-10-18 DIAGNOSIS — I83.813 VARICOSE VEINS OF LEG WITH PAIN, BILATERAL: ICD-10-CM

## 2019-10-18 DIAGNOSIS — I80.02 PHLEBITIS AND THROMBOPHLEBITIS OF SUPERFICIAL VESSELS OF LEFT LOWER EXTREMITY: ICD-10-CM

## 2019-10-18 DIAGNOSIS — I87.2 VENOUS INSUFFICIENCY OF BOTH LOWER EXTREMITIES: Primary | ICD-10-CM

## 2019-10-18 PROCEDURE — 99213 OFFICE O/P EST LOW 20 MIN: CPT | Performed by: PHYSICIAN ASSISTANT

## 2019-10-18 NOTE — PATIENT INSTRUCTIONS
Varicose veins  Venous Insufficiency    -doing well  -R lower leg phlebitis is healed  -mild R ankle edema  -wears stockings "as needed"  -no sx of tired, aching or heavy legs     Recommend continued use of compression and other conservative measures for treatment of venous insufficiency  Particularly remember to wear stockings during flying  Return as needed for re-check - if painful veins, increased edema (swelling) or further episodes of phlebitis (redness, warmth to veins  )    - Order for prescription graded compression stockings of 20-30 mm Hg  - Elevate legs while at rest  - Continue healthy life-style changes; heart-healthy, low sodium diet; regular exercise for weight loss  - Patient education for venous insufficiency  Varicose Veins   WHAT YOU NEED TO KNOW:   What are varicose veins? Varicose veins are veins that become large, twisted, and swollen  They are common on the back of the calves, knees, and thighs  Varicose veins are caused by valves in your veins that do not work properly  This causes blood to collect and increase pressure in the veins of your legs  The increased pressure causes your veins to stretch, get larger, swell, and twist        What increases my risk for varicose veins? · Pregnancy    · A family history of varicose veins    · Being overweight or obese    · Age 48 years or older    · Sitting or standing for long periods of time    · Wearing tight clothing  What are the signs and symptoms of varicose veins? Your symptoms may be worse after you stand or sit for long periods of time  You may have any of the following:  · Blue, purple, or bulging veins in your legs     · Pain, swelling, or muscle cramps in your legs    · Feeling of fatigue or heaviness in your legs  How are varicose veins diagnosed? Your healthcare provider will examine your legs and ask about your medical history  You may need tests, such as a Doppler ultrasound or duplex scan   These tests show your veins and valves, and how your blood is flowing through them  These tests may also show if there is a blockage or blood clot  How are varicose veins treated? The goal of treatment is to decrease symptoms, improve appearance, and prevent further problems  Treatment will depend on which veins are affected and how severe your condition is  You may need procedures to treat or remove your varicose veins  For example, your healthcare provider may inject a solution or use a laser to close the varicose veins  Surgery to remove long veins may also be done  Ask your healthcare provider for more information about procedures used to treat varicose veins  What can I do to manage my symptoms? · Do not sit or stand for long periods of time  This can cause the blood to collect in your legs and make your symptoms worse  Bend or rotate your ankles several times every hour  Walk around for a few minutes every hour to get blood moving in your legs  · Do not cross your legs when you sit  This decreases blood flow to your feet and can make your symptoms worse  · Do not wear tight clothing or shoes  Do not wear high-heeled shoes  Do not wear clothes that are tight around the waist or knees  · Maintain a healthy weight  Being overweight or obese can make your varicose veins worse  Ask your healthcare provider how much you should weigh  Ask him or her to help you create a weight loss plan if you are overweight  · Wear pressure stockings as directed  The stockings are tight and put pressure on your legs  They improve blood flow and help prevent clots  · Elevate your legs  Keep them above the level of your heart for 15 to 30 minutes several times a day  You can also prop the end of your bed up slightly to elevate your legs while you sleep  This will help blood to flow back to your heart  · Get regular exercise  Talk to your healthcare provider about the best exercise plan for you   Exercise can improve blood flow to your legs and feet  When should I seek immediate care? · You have a wound that does not heal or is infected  · You have an injury that has broken your skin and caused your varicose veins to bleed  · Your leg is swollen and hard  · You notice that your legs or feet are turning blue or black  · Your leg feels warm, tender, and painful  It may look swollen and red  When should I contact my healthcare provider? · You have pain in your leg that does not go away or gets worse  · You notice sudden large bruising on your legs  · You have a rash on your leg  · Your symptoms keep you from doing your daily activities  · You have questions or concerns about your condition or care  CARE AGREEMENT:   You have the right to help plan your care  Learn about your health condition and how it may be treated  Discuss treatment options with your caregivers to decide what care you want to receive  You always have the right to refuse treatment  The above information is an  only  It is not intended as medical advice for individual conditions or treatments  Talk to your doctor, nurse or pharmacist before following any medical regimen to see if it is safe and effective for you  © 2017 [employer]  Information is for End User's use only and may not be sold, redistributed or otherwise used for commercial purposes  All illustrations and images included in CareNotes® are the copyrighted property of A D [employer] M [employer]  or Chad Velasquez

## 2019-10-18 NOTE — ASSESSMENT & PLAN NOTE
Varicose veins  Venous Insufficiency  R GSV phlebitis  -doing well  -R lower leg phlebitis is healed  -mild R ankle edema  -wears stockings "as needed"  -no sx of tired, aching or heavy legs    63 M history of bulging varicose veins with venous insufficiency and phlebitis  It seems that he may have developed phlebitis earlier this year after a short flight from [place]  He still has a palpable, hard vein over the R thigh/GSV  It is not tender or painful  No further episodes of phlebitis  He wears compression stockings, but inconsistently  He reports that the edema in the legs is improved  Despite significant reflux on imaging, he has minimal sx  No calf pain  No chest pain or shortness of breath  No fevers  No prior history of blood clots or phlebitis  We discussed that since he has very mild symptoms we can continue with conservative measures and follow as needed  VAS venous du 6/11/19  No DVT  Triphasic waveforms  VAS venous reflux 02/22/17 was reviewed and shows bilateral deep and superficial incompetence of the GSV and R SSV      Plan:  Recommend continued use of compression and other conservative measures for treatment of venous insufficiency  Particularly remember to wear stockings during flying      -Order for prescription graded compression stockings of 20-30 mm Hg  -Elevate legs while at rest  -Continue healthy life-style changes; heart-healthy, low sodium diet; regular exercise for healthy wt  -Patient education for venous insufficiency  -Return as needed for re-check - if painful veins, increased edema or further episodes of phlebitis

## 2019-10-18 NOTE — LETTER
October 18, 2019     Geovanni Mary, DO  [address] [facility] [address]    Patient: Lindsay Angelucci   YOB: 1960   Date of Visit: 10/18/2019     Dear Dr Escobar Garcia      Thank you for referring Lindsay Angelucci to me for evaluation  Below are the relevant portions of my assessment and plan of care  If you have questions, please do not hesitate to call me  I look forward to following Yordy Austin along with you  Sincerely,        Lily Saini PA-C        CC: No Recipients    Progress Notes:    Venous insufficiency of both lower extremities  Varicose veins  Venous Insufficiency  R GSV phlebitis  -doing well  -R lower leg phlebitis is healed  -mild R ankle edema  -wears stockings "as needed"  -no sx of tired, aching or heavy legs    63 M history of bulging varicose veins with venous insufficiency and phlebitis  It seems that he may have developed phlebitis earlier this year after a short flight from [place]  He still has a palpable, hard vein over the R thigh/GSV  It is not tender or painful  No further episodes of phlebitis  He wears compression stockings, but inconsistently  He reports that the edema in the legs is improved  Despite significant reflux on imaging, he has minimal sx  No calf pain  No chest pain or shortness of breath  No fevers  No prior history of blood clots or phlebitis  We discussed that since he has very mild symptoms we can continue with conservative measures and follow as needed  VAS venous du 6/11/19  No DVT  Triphasic waveforms  VAS venous reflux 02/22/17 was reviewed and shows bilateral deep and superficial incompetence of the GSV and R SSV      Plan:  Recommend continued use of compression and other conservative measures for treatment of venous insufficiency   Particularly remember to wear stockings during flying      -Order for prescription graded compression stockings of 20-30 mm Hg  -Elevate legs while at rest  -Continue healthy life-style changes; heart-healthy, low sodium diet; regular exercise for healthy wt  -Patient education for venous insufficiency  -Return as needed for re-check - if painful veins, increased edema or further episodes of phlebitis

## 2019-10-18 NOTE — PROGRESS NOTES
Assessment/Plan:    Venous insufficiency of both lower extremities  Varicose veins  Venous Insufficiency  R GSV phlebitis  -doing well  -R lower leg phlebitis is healed  -mild R ankle edema  -wears stockings "as needed"  -no sx of tired, aching or heavy legs    63 M history of bulging varicose veins with venous insufficiency and phlebitis  It seems that he may have developed phlebitis earlier this year after a short flight from [place]  He still has a palpable, hard vein over the R thigh/GSV  It is not tender or painful  No further episodes of phlebitis  He wears compression stockings, but inconsistently  He reports that the edema in the legs is improved  Despite significant reflux on imaging, he has minimal sx  No calf pain  No chest pain or shortness of breath  No fevers  No prior history of blood clots or phlebitis  We discussed that since he has very mild symptoms we can continue with conservative measures and follow as needed  VAS venous du 6/11/19  No DVT  Triphasic waveforms  VAS venous reflux 02/22/17 was reviewed and shows bilateral deep and superficial incompetence of the GSV and R SSV      Plan:  Recommend continued use of compression and other conservative measures for treatment of venous insufficiency  Particularly remember to wear stockings during flying      -Order for prescription graded compression stockings of 20-30 mm Hg  -Elevate legs while at rest  -Continue healthy life-style changes; heart-healthy, low sodium diet; regular exercise for healthy wt  -Patient education for venous insufficiency  -Return as needed for re-check - if painful veins, increased edema or further episodes of phlebitis  Subjective:      Patient ID: Srini Armenta is a 63 y o  male  Pt is here for a follow up visit to his RLE pain and swelling  Pt denies any pain or swelling on his RLE  Pt is not taking any blood thinning medications or statins      HPI   Srini Armenta 63 M history of bulging varicose veins with venous insufficiency and phlebitis  It seems that he may have developed phlebitis earlier this year after a short flight from [place]  He still has a palpable, hard vein over the R thigh/GSV  It is not tender or painful  No further episodes of phlebitis  He wears compression stockings, but inconsistently  He reports that the edema in the legs is improved  Despite significant reflux on imaging, he has minimal sx  No calf pain  No chest pain or shortness of breath  No fevers  No prior history of blood clots or phlebitis  We discussed that since he has very mild symptoms we can continue with conservative measures and follow as needed  The following portions of the patient's history were reviewed and updated as appropriate: allergies, current medications, past family history, past medical history, past social history, past surgical history and problem list     Review of Systems   Constitutional: Negative  HENT: Negative  Eyes: Negative  Respiratory: Negative  Cardiovascular: Negative  Gastrointestinal: Negative  Endocrine: Negative  Genitourinary: Negative  Musculoskeletal: Negative  Skin: Negative  Allergic/Immunologic: Negative  Neurological: Negative  Hematological: Negative  Psychiatric/Behavioral: Negative  Objective:    /76 (BP Location: Right arm, Patient Position: Sitting, Cuff Size: Adult)   Pulse 68   Temp 97 9 °F (36 6 °C) (Tympanic)   Resp 16   Ht 6' (1 829 m)   Wt 92 5 kg (204 lb)   BMI 27 67 kg/m²          Physical Exam   Constitutional: He is oriented to person, place, and time  He appears well-developed and well-nourished  He is cooperative  HENT:   Head: Normocephalic and atraumatic  Eyes: Pupils are equal, round, and reactive to light  EOM are normal    Neck: Trachea normal  Neck supple  No JVD present  No thyromegaly present     Cardiovascular: Normal rate, regular rhythm, S1 normal, S2 normal and normal heart sounds  Exam reveals no gallop and no friction rub  No murmur heard  Pulses:       Carotid pulses are 2+ on the right side, and 2+ on the left side  Radial pulses are 2+ on the right side, and 2+ on the left side  Dorsalis pedis pulses are 2+ on the right side, and 2+ on the left side  Calves non-tender    Mild R ankle edema    L thigh palpable vein, but redness and tenderness has resolved    Skin integrity intact         Pulmonary/Chest: Effort normal and breath sounds normal  No accessory muscle usage  No respiratory distress  He has no wheezes  He has no rales  Abdominal: Soft  Bowel sounds are normal  He exhibits no distension  There is no hepatosplenomegaly  There is no tenderness  Musculoskeletal: Normal range of motion  He exhibits no edema or deformity  Neurological: He is alert and oriented to person, place, and time  Grossly normal    Skin: Skin is warm and dry  No lesion and no rash noted  No cyanosis  Nails show no clubbing  Psychiatric: He has a normal mood and affect  Nursing note and vitals reviewed  I have reviewed and made appropriate changes to the review of systems input by the medical assistant      Vitals:    10/18/19 1306   BP: 124/76   BP Location: Right arm   Patient Position: Sitting   Cuff Size: Adult   Pulse: 68   Resp: 16   Temp: 97 9 °F (36 6 °C)   TempSrc: Tympanic   Weight: 92 5 kg (204 lb)   Height: 6' (1 829 m)       Patient Active Problem List   Diagnosis    Varicose veins of leg with pain, bilateral    Venous insufficiency of both lower extremities    Left groin hernia    Chronic idiopathic constipation    Lower abdominal pain    Unilateral inguinal hernia without obstruction or gangrene    Follow-up surgery care    Phlebitis and thrombophlebitis of superficial vessels of left lower extremity       Past Surgical History:   Procedure Laterality Date    COLONOSCOPY      OK REPAIR ING HERNIA,5+Y/O,REDUCIBL Left 4/9/2019    Procedure: REPAIR HERNIA INGUINAL;  Surgeon: Max Barahona MD;  Location: [facility];  Service: General    SKIN BIOPSY      multiple basal cell cancers removed-local anesthesia       Family History   Problem Relation Age of Onset    Lung cancer Father     Hypertension Father     Other Father         nicotine abuse    Cancer Father         lung    Heart attack Maternal Grandfather        Social History     Socioeconomic History    Marital status: /Civil Union     Spouse name: Not on file    Number of children: Not on file    Years of education: Not on file    Highest education level: Not on file   Occupational History    Not on file   Social Needs    Financial resource strain: Not on file    Food insecurity:     Worry: Not on file     Inability: Not on file    Transportation needs:     Medical: Not on file     Non-medical: Not on file   Tobacco Use    Smoking status: Never Smoker    Smokeless tobacco: Never Used   Substance and Sexual Activity    Alcohol use:  Yes     Alcohol/week: 14 0 standard drinks     Types: 14 Cans of beer per week     Frequency: 4 or more times a week     Drinks per session: 1 or 2    Drug use: Not on file    Sexual activity: Not on file   Lifestyle    Physical activity:     Days per week: Not on file     Minutes per session: Not on file    Stress: Not on file   Relationships    Social connections:     Talks on phone: Not on file     Gets together: Not on file     Attends Yazdanism service: Not on file     Active member of club or organization: Not on file     Attends meetings of clubs or organizations: Not on file     Relationship status: Not on file    Intimate partner violence:     Fear of current or ex partner: Not on file     Emotionally abused: Not on file     Physically abused: Not on file     Forced sexual activity: Not on file   Other Topics Concern    Not on file   Social History Narrative    Daily coffee consumption    Lack of exercise    Pets/Animals: Cat/Dog Sleeps 6-7 hours a day       No Known Allergies      Current Outpatient Medications:     acetaminophen (TYLENOL) 500 mg tablet, Take 500 mg by mouth every 6 (six) hours as needed for mild pain, Disp: , Rfl:     multivitamin (THERAGRAN) TABS, Take 1 tablet by mouth daily, Disp: , Rfl:     naproxen sodium (ALEVE) 220 MG tablet, Take 1 tablet by mouth 3 (three) times a day as needed, Disp: , Rfl:     sildenafil (VIAGRA) 100 mg tablet, Take 1 tablet by mouth as needed , Disp: , Rfl:     Cyanocobalamin (VITAMIN B 12 PO), Take by mouth daily, Disp: , Rfl:     Elastic Bandages & Supports (MEDICAL COMPRESSION STOCKINGS) MISC, by Does not apply route daily Knee High 20-30mmHg, Disp: 2 each, Rfl: 4

## 2020-12-07 ENCOUNTER — OFFICE VISIT (OUTPATIENT)
Dept: PODIATRY | Facility: CLINIC | Age: 60
End: 2020-12-07
Payer: COMMERCIAL

## 2020-12-07 VITALS
SYSTOLIC BLOOD PRESSURE: 121 MMHG | DIASTOLIC BLOOD PRESSURE: 84 MMHG | RESPIRATION RATE: 17 BRPM | HEIGHT: 72 IN | WEIGHT: 210 LBS | BODY MASS INDEX: 28.44 KG/M2 | HEART RATE: 79 BPM | TEMPERATURE: 97.7 F

## 2020-12-07 DIAGNOSIS — M21.962 ACQUIRED DEFORMITY OF LEFT FOOT: ICD-10-CM

## 2020-12-07 DIAGNOSIS — M21.42 ACQUIRED FLAT FOOT, LEFT: ICD-10-CM

## 2020-12-07 DIAGNOSIS — M21.41 ACQUIRED FLAT FOOT, RIGHT: ICD-10-CM

## 2020-12-07 DIAGNOSIS — M76.61 ACHILLES TENDINITIS OF RIGHT LOWER EXTREMITY: Primary | ICD-10-CM

## 2020-12-07 DIAGNOSIS — M21.961 ACQUIRED DEFORMITY OF RIGHT FOOT: ICD-10-CM

## 2020-12-07 PROCEDURE — 20551 NJX 1 TENDON ORIGIN/INSJ: CPT | Performed by: PODIATRIST

## 2020-12-07 PROCEDURE — 99202 OFFICE O/P NEW SF 15 MIN: CPT | Performed by: PODIATRIST

## 2020-12-07 PROCEDURE — 73620 X-RAY EXAM OF FOOT: CPT | Performed by: PODIATRIST

## 2020-12-07 PROCEDURE — L3000 FT INSERT UCB BERKELEY SHELL: HCPCS | Performed by: PODIATRIST

## 2020-12-07 RX ORDER — MELOXICAM 7.5 MG/1
7.5 TABLET ORAL DAILY
Qty: 20 TABLET | Refills: 0 | Status: SHIPPED | OUTPATIENT
Start: 2020-12-07 | End: 2021-12-06

## 2021-12-06 ENCOUNTER — OFFICE VISIT (OUTPATIENT)
Dept: FAMILY MEDICINE CLINIC | Facility: CLINIC | Age: 61
End: 2021-12-06
Payer: COMMERCIAL

## 2021-12-06 ENCOUNTER — APPOINTMENT (OUTPATIENT)
Dept: RADIOLOGY | Facility: CLINIC | Age: 61
End: 2021-12-06
Payer: COMMERCIAL

## 2021-12-06 VITALS
HEIGHT: 71 IN | DIASTOLIC BLOOD PRESSURE: 80 MMHG | WEIGHT: 203 LBS | OXYGEN SATURATION: 97 % | HEART RATE: 76 BPM | SYSTOLIC BLOOD PRESSURE: 124 MMHG | BODY MASS INDEX: 28.42 KG/M2 | RESPIRATION RATE: 16 BRPM | TEMPERATURE: 98 F

## 2021-12-06 DIAGNOSIS — Z00.00 WELL ADULT EXAM: Primary | ICD-10-CM

## 2021-12-06 DIAGNOSIS — Z12.11 SCREEN FOR COLON CANCER: ICD-10-CM

## 2021-12-06 DIAGNOSIS — M79.2 NEURITIS: ICD-10-CM

## 2021-12-06 DIAGNOSIS — Z11.59 NEED FOR HEPATITIS C SCREENING TEST: ICD-10-CM

## 2021-12-06 DIAGNOSIS — M47.812 SPONDYLOSIS OF CERVICAL REGION WITHOUT MYELOPATHY OR RADICULOPATHY: ICD-10-CM

## 2021-12-06 DIAGNOSIS — Z12.5 SCREENING FOR PROSTATE CANCER: ICD-10-CM

## 2021-12-06 DIAGNOSIS — Z13.6 SCREENING FOR CARDIOVASCULAR CONDITION: ICD-10-CM

## 2021-12-06 DIAGNOSIS — N52.8 OTHER MALE ERECTILE DYSFUNCTION: ICD-10-CM

## 2021-12-06 PROBLEM — K40.90 LEFT GROIN HERNIA: Status: RESOLVED | Noted: 2019-03-27 | Resolved: 2021-12-06

## 2021-12-06 PROBLEM — K40.90 UNILATERAL INGUINAL HERNIA WITHOUT OBSTRUCTION OR GANGRENE: Status: RESOLVED | Noted: 2019-03-27 | Resolved: 2021-12-06

## 2021-12-06 PROBLEM — Z09 FOLLOW-UP SURGERY CARE: Chronic | Status: ACTIVE | Noted: 2019-04-23

## 2021-12-06 PROBLEM — R10.30 LOWER ABDOMINAL PAIN: Status: RESOLVED | Noted: 2019-03-27 | Resolved: 2021-12-06

## 2021-12-06 PROBLEM — I80.02 PHLEBITIS AND THROMBOPHLEBITIS OF SUPERFICIAL VESSELS OF LEFT LOWER EXTREMITY: Status: RESOLVED | Noted: 2019-05-31 | Resolved: 2021-12-06

## 2021-12-06 PROBLEM — Z09 FOLLOW-UP SURGERY CARE: Chronic | Status: RESOLVED | Noted: 2019-04-23 | Resolved: 2021-12-06

## 2021-12-06 PROCEDURE — 99396 PREV VISIT EST AGE 40-64: CPT | Performed by: FAMILY MEDICINE

## 2021-12-06 PROCEDURE — 72050 X-RAY EXAM NECK SPINE 4/5VWS: CPT

## 2021-12-06 PROCEDURE — 1036F TOBACCO NON-USER: CPT | Performed by: FAMILY MEDICINE

## 2021-12-06 PROCEDURE — 3725F SCREEN DEPRESSION PERFORMED: CPT | Performed by: FAMILY MEDICINE

## 2021-12-06 PROCEDURE — 3008F BODY MASS INDEX DOCD: CPT | Performed by: FAMILY MEDICINE

## 2021-12-06 RX ORDER — SILDENAFIL 100 MG/1
100 TABLET, FILM COATED ORAL DAILY PRN
Qty: 10 TABLET | Refills: 5 | Status: SHIPPED | OUTPATIENT
Start: 2021-12-06

## 2021-12-07 LAB
ALBUMIN SERPL-MCNC: 4.7 G/DL (ref 3.8–4.9)
ALBUMIN/GLOB SERPL: 1.7 {RATIO} (ref 1.2–2.2)
ALP SERPL-CCNC: 79 IU/L (ref 44–121)
ALT SERPL-CCNC: 18 IU/L (ref 0–44)
AST SERPL-CCNC: 18 IU/L (ref 0–40)
BILIRUB SERPL-MCNC: 0.5 MG/DL (ref 0–1.2)
BUN SERPL-MCNC: 13 MG/DL (ref 8–27)
BUN/CREAT SERPL: 13 (ref 10–24)
CALCIUM SERPL-MCNC: 9.6 MG/DL (ref 8.6–10.2)
CHLORIDE SERPL-SCNC: 103 MMOL/L (ref 96–106)
CHOLEST SERPL-MCNC: 179 MG/DL (ref 100–199)
CO2 SERPL-SCNC: 24 MMOL/L (ref 20–29)
CREAT SERPL-MCNC: 1.04 MG/DL (ref 0.76–1.27)
GLOBULIN SER-MCNC: 2.7 G/DL (ref 1.5–4.5)
GLUCOSE SERPL-MCNC: 93 MG/DL (ref 65–99)
HCV AB S/CO SERPL IA: <0.1 S/CO RATIO (ref 0–0.9)
HDLC SERPL-MCNC: 67 MG/DL
LDLC SERPL CALC-MCNC: 100 MG/DL (ref 0–99)
MICRODELETION SYND BLD/T FISH: NORMAL
POTASSIUM SERPL-SCNC: 4.6 MMOL/L (ref 3.5–5.2)
PROT SERPL-MCNC: 7.4 G/DL (ref 6–8.5)
PSA SERPL-MCNC: 0.4 NG/ML (ref 0–4)
SL AMB EGFR AFRICAN AMERICAN: 90 ML/MIN/1.73
SL AMB EGFR NON AFRICAN AMERICAN: 78 ML/MIN/1.73
SODIUM SERPL-SCNC: 141 MMOL/L (ref 134–144)
TRIGL SERPL-MCNC: 63 MG/DL (ref 0–149)

## 2021-12-16 ENCOUNTER — TELEPHONE (OUTPATIENT)
Dept: FAMILY MEDICINE CLINIC | Facility: CLINIC | Age: 61
End: 2021-12-16

## 2022-01-11 ENCOUNTER — EVALUATION (OUTPATIENT)
Dept: PHYSICAL THERAPY | Facility: CLINIC | Age: 62
End: 2022-01-11
Payer: COMMERCIAL

## 2022-01-11 DIAGNOSIS — M79.2 NEURITIS: ICD-10-CM

## 2022-01-11 DIAGNOSIS — M48.02 FORAMINAL STENOSIS OF CERVICAL REGION: ICD-10-CM

## 2022-01-11 PROCEDURE — 97161 PT EVAL LOW COMPLEX 20 MIN: CPT

## 2022-01-11 NOTE — PROGRESS NOTES
PT Evaluation     Today's date: 2022  Patient name: Jerry Marcelo  : 1960  MRN: [id number]  Referring provider: Patrick Vides DO  Dx:   Encounter Diagnosis     ICD-10-CM    1  Neuritis  M79 2 Ambulatory referral to Physical Therapy   2  Foraminal stenosis of cervical region  M48 02 Ambulatory referral to Physical Therapy     (3/9/22) Pt was evaluated and has not followed up since initial visit  Due to inactivity greater than 30 days, this episode will be discharged  A new episode will be opened should pt return  Assessment  Assessment details: Jerry Marcelo is a 62 y o  male who presents to Physical Therapy at [place] [address] with chronic neck pain with radicular symptoms into the right hand  Patient presents with the following impairments: neck pain, radicular symptoms into the right hand, fair posture, abnormal muscle tissue tension, and limited activity tolerance  Due to these impairments, patient has difficulty performing the following: looking up, prolonged head postures, working overhead, lifting overhead, and prolonged sitting  Patient has been educated in home exercise program and plan of care  Patient was also educated on potential causes for right arm radicular symptoms and the centralization phenomenon  Patient would benefit from skilled physical therapy services to address the above functional limitations and progress towards prior level of function and independence with home exercise program   Impairments: abnormal muscle firing, abnormal or restricted ROM, activity intolerance, impaired physical strength, lacks appropriate home exercise program, pain with function, scapular dyskinesis, poor posture  and poor body mechanics  Understanding of Dx/Px/POC: good   Prognosis: good    Goals  Short Term Goals (3 weeks; target date: 22)  1  Patient will be independent with initial HEP  2  Patient will demonstrate an increase in cervical sidebending AROM by 5 degrees in both sides  3  Patient will be able to self-correct posture in the clinic 50% of the time or greater  Long Term Goals (6 weeks; target date: 4/1/22)  1  Patient will demonstrate an increase in cervical AROM to WNL in order to promote self-care activities pain-free  2  Patient will be able to self-correct posture in the clinic 75% of the time or greater  3  Patient will present with decreased frequency of headache symptoms to 1-2x a month  4  Patient will be independent with comprehensive HEP  5  Patient will abolish radicular symptoms into either UE  Plan  Plan details: Patient has been educated on the facility's COVID precautions and procedures  Patient has also been educated on the facility's cancellation policy and has verbalized agreement with this     Patient would benefit from: skilled physical therapy  Planned modality interventions: cryotherapy, electrical stimulation/Russian stimulation, TENS, ultrasound, thermotherapy: hydrocollator packs, traction, high voltage pulsed current: spasm management and high voltage pulsed current: pain management  Planned therapy interventions: flexibility, functional ROM exercises, graded exercise, home exercise program, joint mobilization, manual therapy, neuromuscular re-education, patient education, strengthening, stretching, therapeutic exercise, therapeutic activities, balance/weight bearing training, gait training, abdominal trunk stabilization, self care, postural training, activity modification, ADL retraining, ADL training, balance, behavior modification, body mechanics training, breathing training, therapeutic training, transfer training, graded activity, graded motor, muscle pump exercises, [place] taping and IADL retraining  Frequency: 2x week  Duration in weeks: 6  Plan of Care beginning date: 1/11/2022  Plan of Care expiration date: 4/11/2022  Treatment plan discussed with: patient        Subjective Evaluation    History of Present Illness  Mechanism of injury: Pt reports chronic neck pain and history of this problem for over 10 years  Pt states that he recently went to his PCP for a physical  Pt states that his neck pain has been getting worse over the past several months  Pt states that he has numbness and tingling in his whole hand, except for his thumb  Pt states that he did do physical therapy for this problem over 10 years ago, which did help to decrease pain, but never completely eliminated it  Pt states that he had a recent x-ray, showing arthritic changes in his neck  Pt states that he also does experience headaches daily, which may not last long, but are chronic and recurring  Pt states that he wishes to get more information about why he has tingling into his arm (such as obtaining an MRI)  Pt was referred to PT  Pain  Current pain ratin  At best pain ratin  At worst pain ratin  Location: Base of neck   Quality: radiating  Relieving factors: change in position  Progression: no change    Social Support  Lives with: spouse and adult children    Employment status: working ()  Hand dominance: right  Exercise history: "Not too often" - active while working as an        Diagnostic Tests  X-ray: abnormal  Treatments  Previous treatment: physical therapy  Patient Goals  Patient goals for therapy: decreased pain          Objective     Postural Observations  Seated posture: fair    Additional Postural Observation Details  Slight forward head, moderate rounded shoulder posture while seated; prominent T1 spinous process     Palpation     Right   Hypertonic in the levator scapulae  Tenderness of the suboccipitals       Active Range of Motion   Cervical/Thoracic Spine       Cervical    Flexion: 50 degrees   Extension: 50 degrees     with pain  Left lateral flexion: 22 degrees      Right lateral flexion: 20 degrees      Left rotation: 65 degrees  Right rotation: 60 degrees           Additional Active Range of Motion Details  Pt reports mild pain in the base of the neck with extension     Strength/Myotome Testing   Cervical Spine     Left   Interossei strength (t1): 5  Neck lateral flexion (C3): 5  Levator scapulae (C4): 5    Right   Interossei strength (t1): 5  Neck lateral flexion (C3): 5  Levator scapulae (C4): 5    Left Shoulder     Planes of Motion   Abduction: 5     Isolated Muscles   Levator scapulae: 5     Right Shoulder     Planes of Motion   Abduction: 5     Isolated Muscles   Levator scapulae: 5     Left Elbow   Flexion: 5  Extension: 5    Right Elbow   Flexion: 5  Extension: 5    Left Wrist/Hand   Thumb extension: 5    Right Wrist/Hand   Thumb extension: 5    Tests   Cervical   Positive cervical distraction test   Negative vertical compression  Lumbar   Negative vertical compression       Additional Tests Details  Supine manual distraction --> slight relief in neck pain             Precautions: BLE venous insufficiency, chronic neck pain     EVAL 2 3 4 5   Manuals 1/11/22       STM/MFR        Manual distraction, SOR        Joint mobs                 Neuro Re-Ed        Chin tucks Instructed seated       Scap retractions        Rows 10x black TB       Shoulder extensions                                Ther Ex        Cervical AROM        Seated thoracic extension        Open book                                                Ther Activity        Pt education POC, HEP       Postural education Performed                                               Modalities

## 2022-02-03 ENCOUNTER — TELEPHONE (OUTPATIENT)
Dept: GASTROENTEROLOGY | Facility: CLINIC | Age: 62
End: 2022-02-03

## 2022-02-03 NOTE — TELEPHONE ENCOUNTER
Received order from Dr Marc Lane to call and schedule colonoscopy screening that patient is due for end of March with Dr Niki Marti  I left message for patient to call and will call again in 1 wk if he doesn't return call to schedule

## 2022-02-04 NOTE — TELEPHONE ENCOUNTER
Pt scheduled       Scheduled date of colonoscopy (as of today):3/29/22  Physician performing colonoscopy:Dr Alisa Kaur  Location of colonoscopy:[facility]  Bowel prep reviewed with patient:Miralax w/ dul   Instructions reviewed with patient by:anni  Clearances: n/a

## 2022-02-04 NOTE — TELEPHONE ENCOUNTER
Pt returned your call  I called back and had to lmom for him to call back again to get scheduled  If he calls please get him scheduled  Thank you

## 2022-03-29 ENCOUNTER — ANESTHESIA (OUTPATIENT)
Dept: GASTROENTEROLOGY | Facility: AMBULATORY SURGERY CENTER | Age: 62
End: 2022-03-29

## 2022-03-29 ENCOUNTER — ANESTHESIA EVENT (OUTPATIENT)
Dept: GASTROENTEROLOGY | Facility: AMBULATORY SURGERY CENTER | Age: 62
End: 2022-03-29

## 2022-03-29 ENCOUNTER — HOSPITAL ENCOUNTER (OUTPATIENT)
Dept: GASTROENTEROLOGY | Facility: AMBULATORY SURGERY CENTER | Age: 62
Discharge: HOME | End: 2022-03-29
Payer: COMMERCIAL

## 2022-03-29 VITALS
TEMPERATURE: 97.4 F | HEIGHT: 72 IN | DIASTOLIC BLOOD PRESSURE: 79 MMHG | BODY MASS INDEX: 27.09 KG/M2 | WEIGHT: 200 LBS | HEART RATE: 65 BPM | RESPIRATION RATE: 18 BRPM | OXYGEN SATURATION: 99 % | SYSTOLIC BLOOD PRESSURE: 120 MMHG

## 2022-03-29 DIAGNOSIS — Z12.11 SCREENING FOR COLON CANCER: ICD-10-CM

## 2022-03-29 PROCEDURE — 00812 ANES LWR INTST SCR COLSC: CPT | Performed by: NURSE ANESTHETIST, CERTIFIED REGISTERED

## 2022-03-29 PROCEDURE — G0121 COLON CA SCRN NOT HI RSK IND: HCPCS | Performed by: INTERNAL MEDICINE

## 2022-03-29 RX ORDER — SODIUM CHLORIDE 9 MG/ML
INJECTION, SOLUTION INTRAVENOUS CONTINUOUS PRN
Status: DISCONTINUED | OUTPATIENT
Start: 2022-03-29 | End: 2022-03-29

## 2022-03-29 RX ORDER — SODIUM CHLORIDE 9 MG/ML
20 INJECTION, SOLUTION INTRAVENOUS CONTINUOUS
Status: DISCONTINUED | OUTPATIENT
Start: 2022-03-29 | End: 2022-04-02 | Stop reason: HOSPADM

## 2022-03-29 RX ORDER — SODIUM CHLORIDE 9 MG/ML
50 INJECTION, SOLUTION INTRAVENOUS CONTINUOUS
Status: DISCONTINUED | OUTPATIENT
Start: 2022-03-29 | End: 2022-04-02 | Stop reason: HOSPADM

## 2022-03-29 RX ORDER — PROPOFOL 10 MG/ML
INJECTION, EMULSION INTRAVENOUS AS NEEDED
Status: DISCONTINUED | OUTPATIENT
Start: 2022-03-29 | End: 2022-03-29

## 2022-03-29 RX ORDER — SODIUM CHLORIDE 9 MG/ML
30 INJECTION, SOLUTION INTRAVENOUS CONTINUOUS
Status: DISCONTINUED | OUTPATIENT
Start: 2022-03-29 | End: 2022-04-02 | Stop reason: HOSPADM

## 2022-03-29 RX ADMIN — PROPOFOL 20 MG: 10 INJECTION, EMULSION INTRAVENOUS at 12:23

## 2022-03-29 RX ADMIN — PROPOFOL 20 MG: 10 INJECTION, EMULSION INTRAVENOUS at 12:33

## 2022-03-29 RX ADMIN — PROPOFOL 130 MG: 10 INJECTION, EMULSION INTRAVENOUS at 12:19

## 2022-03-29 RX ADMIN — PROPOFOL 20 MG: 10 INJECTION, EMULSION INTRAVENOUS at 12:21

## 2022-03-29 RX ADMIN — PROPOFOL 20 MG: 10 INJECTION, EMULSION INTRAVENOUS at 12:27

## 2022-03-29 RX ADMIN — SODIUM CHLORIDE: 9 INJECTION, SOLUTION INTRAVENOUS at 12:15

## 2022-03-29 RX ADMIN — PROPOFOL 20 MG: 10 INJECTION, EMULSION INTRAVENOUS at 12:25

## 2022-03-29 RX ADMIN — PROPOFOL 20 MG: 10 INJECTION, EMULSION INTRAVENOUS at 12:29

## 2022-03-29 RX ADMIN — PROPOFOL 20 MG: 10 INJECTION, EMULSION INTRAVENOUS at 12:31

## 2022-03-29 NOTE — DISCHARGE INSTRUCTIONS
Colonoscopy   WHAT YOU NEED TO KNOW:   A colonoscopy is a procedure to examine the inside of your colon (intestine) with a scope  Polyps or tissue growths may have been removed during your colonoscopy  It is normal to feel bloated and to have some abdominal discomfort  You should be passing gas  If you have hemorrhoids or you had polyps removed, you may have a small amount of bleeding  DISCHARGE INSTRUCTIONS:   Seek care immediately if:    You have sudden, severe abdominal pain   You have problems swallowing   You have a large amount of black, sticky bowel movements or blood in your bowel movements   You have sudden trouble breathing   You feel weak, lightheaded, or faint or your heart beats faster than normal for you  Contact your healthcare provider if:    You have a fever and chills   You have nausea or are vomiting   Your abdomen is bloated or feels full and hard   You have abdominal pain   You have black, sticky bowel movements or blood in your bowel movements   You have not had a bowel movement for 3 days after your procedure   You have rash or hives   You have questions or concerns about your procedure  Activity:    Do not lift, strain, or run for 24 hours after your procedure   Rest after your procedure  You have been given medicine to relax you  Do not drive or make important decisions until the day after your procedure  Return to your normal activity as directed   Relieve gas and discomfort from bloating by lying on your right side with a heating pad on your abdomen  You may need to take short walks to help the gas move out  Eat small meals until bloating is relieved  Follow up with your healthcare provider as directed: Write down your questions so you remember to ask them during your visits  If you take a blood thinner, please review the specific instructions from your endoscopist about when you should resume it   These can be found in the Recommendation and Your Medication list sections of this After Visit Summary  Hemorrhoids   AMBULATORY CARE:   Hemorrhoids  are swollen blood vessels inside your rectum (internal hemorrhoids) or on your anus (external hemorrhoids)  Sometimes a hemorrhoid may prolapse  This means it extends out of your anus  Common symptoms include the following:   · Pain or itching around your anus or inside your rectum    · Swelling or bumps around your anus    · Bright red blood in your bowel movement, on the toilet paper, or in the toilet bowl    · Tissue bulging out of your anus (prolapsed hemorrhoids)    · Incontinence (poor control over urine or bowel movements)    Seek care immediately if:   · You have severe pain in your rectum or around your anus  · You have severe pain in your abdomen and you are vomiting  · You have bleeding from your anus that soaks through your underwear  Contact your healthcare provider if:   · You have frequent and painful bowel movements  · Your hemorrhoid looks or feels more swollen than usual      · You do not have a bowel movement for 2 days or more  · You see or feel tissue coming through your anus  · You have questions or concerns about your condition or care  Treatment for hemorrhoids  may include medicines to decrease pain, swelling, and itching  The medicine may be a pill, pad, cream, or ointment  Medicine may also be given to soften your bowel movement  Surgery and other procedures may be needed to shrink or remove your hemorrhoids  Manage your symptoms:   · Apply ice on your anus for 15 to 20 minutes every hour or as directed  Use an ice pack, or put crushed ice in a plastic bag  Cover it with a towel before you apply it to your anus  Ice helps prevent tissue damage and decreases swelling and pain  · Take a sitz bath  Fill a bathtub with 4 to 6 inches of warm water  You may also use a sitz bath pan that fits inside a toilet bowl   Sit in the sitz bath for 15 minutes  Do this 3 times a day, and after each bowel movement  The warm water can help decrease pain and swelling  · Keep your anal area clean  Gently wash the area with warm water daily  Soap may irritate the area  After a bowel movement, wipe with moist towelettes or wet toilet paper  Dry toilet paper can irritate the area  Prevent hemorrhoids:   · Do not strain to have a bowel movement  Do not sit on the toilet too long  These actions can increase pressure on the tissues in your rectum and anus  · Drink plenty of liquids  Liquids can help prevent constipation  Ask how much liquid to drink each day and which liquids are best for you  · Eat a variety of high-fiber foods  Examples include fruits, vegetables, and whole grains  Ask your healthcare provider how much fiber you need each day  You may need to take a fiber supplement  · Exercise as directed  Exercise, such as walking, may make it easier to have a bowel movement  Ask your healthcare provider to help you create an exercise plan  · Do not have anal sex  Anal sex can weaken the skin around your rectum and anus  · Avoid heavy lifting  This can cause straining and increase your risk for another hemorrhoid  Follow up with your doctor as directed:  Write down your questions so you remember to ask them during your visits  © Copyright [employer] 2022 Information is for End User's use only and may not be sold, redistributed or otherwise used for commercial purposes  All illustrations and images included in CareNotes® are the copyrighted property of A D A M , Inc  or [facility] [employer]   The above information is an  only  It is not intended as medical advice for individual conditions or treatments  Talk to your doctor, nurse or pharmacist before following any medical regimen to see if it is safe and effective for you    High Fiber Diet   AMBULATORY CARE:   A high-fiber diet  includes foods that have a high amount of fiber  Fiber is the part of fruits, vegetables, and grains that is not broken down by your body  Fiber keeps your bowel movements regular  Fiber can also help lower your cholesterol level, control blood sugar in people with diabetes, and relieve constipation  Fiber can also help you control your weight because it helps you feel full faster  Most adults should eat 25 to 35 grams of fiber each day  Talk to your dietitian or healthcare provider about the amount of fiber you need  Good sources of fiber:       · Foods with at least 4 grams of fiber per serving:      ? ? to ½ cup of high-fiber cereal (check the nutrition label on the box)    ? ½ cup of blackberries or raspberries    ? 4 dried prunes    ? 1 cooked artichoke    ? ½ cup of cooked legumes, such as lentils, or red, kidney, and link beans    · Foods with 1 to 3 grams of fiber per serving:      ? 1 slice of whole-wheat, pumpernickel, or rye bread    ? ½ cup of cooked brown rice    ? 4 whole-wheat crackers    ? 1 cup of oatmeal    ? ½ cup of cereal with 1 to 3 grams of fiber per serving (check the nutrition label on the box)    ? 1 small piece of fruit, such as an apple, banana, pear, kiwi, or orange    ? 3 dates    ? ½ cup of canned apricots, fruit cocktail, peaches, or pears    ? ½ cup of raw or cooked vegetables, such as carrots, cauliflower, cabbage, spinach, squash, or corn    Ways that you can increase fiber in your diet:   · Choose brown or wild rice instead of white rice  · Use whole wheat flour in recipes instead of white or all-purpose flour  · Add beans and peas to casseroles or soups  · Choose fresh fruit and vegetables with peels or skins on instead of juices  Other diet guidelines to follow:   · Add fiber to your diet slowly  You may have abdominal discomfort, bloating, and gas if you add fiber to your diet too quickly  · Drink plenty of liquids as you add fiber to your diet    You may have nausea or develop constipation if you do not drink enough water  Ask how much liquid to drink each day and which liquids are best for you  © Copyright [employer] 2022 Information is for End User's use only and may not be sold, redistributed or otherwise used for commercial purposes  All illustrations and images included in CareNotes® are the copyrighted property of [employer]  or [facility] [employer]   The above information is an  only  It is not intended as medical advice for individual conditions or treatments  Talk to your doctor, nurse or pharmacist before following any medical regimen to see if it is safe and effective for you

## 2022-03-29 NOTE — ANESTHESIA POSTPROCEDURE EVALUATION
Post-Op Assessment Note    CV Status:  Stable  Pain Score: 0    Pain management: adequate     Mental Status:  Alert and awake   Hydration Status:  Euvolemic   PONV Controlled:  Controlled   Airway Patency:  Patent      Post Op Vitals Reviewed: Yes      Staff: CRNA         No complications documented      BP   96/53   Temp     Pulse  62   Resp   18   SpO2   100%

## 2022-03-29 NOTE — H&P
History and Physical -  [facility]  Emil Rodriguez 62 y o  male MRN: [id number]                  HPI: Emil Rodriguez is a 62y o  year old male who presents for colonoscopy for colon cancer screening      REVIEW OF SYSTEMS: Per the HPI, and otherwise unremarkable      Historical Information   Past Medical History:   Diagnosis Date    Basal cell carcinoma     basal cell skin cancer    Cervical radiculopathy     with numbness right arm-chronic issue-pinched nerve    Colon polyp     Lateral epicondylitis 05/04/2005    Varicosities of leg      Past Surgical History:   Procedure Laterality Date    COLONOSCOPY      HERNIA REPAIR Left     inguinal    KS REPAIR ING HERNIA,5+Y/O,REDUCIBL Left 4/9/2019    Procedure: REPAIR HERNIA INGUINAL;  Surgeon: Jordin Fernandes MD;  Location: [facility];  Service: General    SKIN BIOPSY      multiple basal cell cancers removed-local anesthesia     Social History   Social History     Substance and Sexual Activity   Alcohol Use Yes    Alcohol/week: 14 0 standard drinks    Types: 14 Cans of beer per week     Social History     Substance and Sexual Activity   Drug Use Not on file     Social History     Tobacco Use   Smoking Status Never Smoker   Smokeless Tobacco Never Used     Family History   Problem Relation Age of Onset    Lung cancer Father     Hypertension Father     Other Father         nicotine abuse    Cancer Father         lung    Heart attack Maternal Grandfather        Meds/Allergies       Current Outpatient Medications:     acetaminophen (TYLENOL) 500 mg tablet    multivitamin (THERAGRAN) TABS    naproxen sodium (ALEVE) 220 MG tablet    sildenafil (Viagra) 100 mg tablet    Current Facility-Administered Medications:     sodium chloride 0 9 % infusion, 30 mL/hr, Intravenous, Continuous    No Known Allergies    Objective     /77   Pulse 69   Temp (!) 97 4 °F (36 3 °C) (Temporal)   Resp 18   Ht 6' (1 829 m)   Wt 90 7 kg (200 lb)   SpO2 98%   BMI 27 12 kg/m²       PHYSICAL EXAM    Gen: NAD  Head: NCAT  CV: RRR  CHEST: Clear  ABD: soft, NT/ND  EXT: no edema      ASSESSMENT/PLAN:  This is a 62y o  year old male here for colonoscopy, and he is stable and optimized for his procedure

## 2022-03-29 NOTE — ANESTHESIA PREPROCEDURE EVALUATION
Procedure:  COLONOSCOPY    Relevant Problems   CARDIO   (+) Venous insufficiency of both lower extremities      MUSCULOSKELETAL   (+) Spondylosis of cervical region without myelopathy or radiculopathy        Physical Exam    Airway    Mallampati score: II  TM Distance: >3 FB  Neck ROM: full     Dental       Cardiovascular  Rhythm: regular, Rate: normal, Cardiovascular exam normal    Pulmonary  Pulmonary exam normal Breath sounds clear to auscultation,     Other Findings        Anesthesia Plan  ASA Score- 2     Anesthesia Type- IV sedation with anesthesia with ASA Monitors  Additional Monitors:   Airway Plan:           Plan Factors-Exercise tolerance (METS): >4 METS  Chart reviewed  Patient summary reviewed  Patient is not a current smoker  Induction-     Postoperative Plan-     Informed Consent- Anesthetic plan and risks discussed with patient

## 2022-10-11 PROBLEM — Z12.11 COLON CANCER SCREENING: Status: RESOLVED | Noted: 2022-03-29 | Resolved: 2022-10-11

## 2023-06-19 ENCOUNTER — OFFICE VISIT (OUTPATIENT)
Dept: FAMILY MEDICINE CLINIC | Facility: CLINIC | Age: 63
End: 2023-06-19
Payer: COMMERCIAL

## 2023-06-19 VITALS
SYSTOLIC BLOOD PRESSURE: 140 MMHG | HEIGHT: 72 IN | OXYGEN SATURATION: 99 % | BODY MASS INDEX: 27.9 KG/M2 | DIASTOLIC BLOOD PRESSURE: 82 MMHG | TEMPERATURE: 97.3 F | HEART RATE: 67 BPM | WEIGHT: 206 LBS | RESPIRATION RATE: 16 BRPM

## 2023-06-19 DIAGNOSIS — M54.2 CERVICAL PAIN: ICD-10-CM

## 2023-06-19 DIAGNOSIS — T14.8XXA HEMATOMA: ICD-10-CM

## 2023-06-19 DIAGNOSIS — S00.81XA ABRASION OF FACE, INITIAL ENCOUNTER: ICD-10-CM
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
## 2023-06-19 DIAGNOSIS — S00.83XA CONTUSION OF FACE, INITIAL ENCOUNTER: ICD-10-CM

## 2023-06-19 PROCEDURE — 99214 OFFICE O/P EST MOD 30 MIN: CPT | Performed by: NURSE PRACTITIONER

## 2023-06-19 RX ORDER — CEPHALEXIN 500 MG/1
500 CAPSULE ORAL EVERY 12 HOURS
COMMUNITY
Start: 2023-06-17

## 2023-06-19 RX ORDER — HYDROCODONE BITARTRATE AND ACETAMINOPHEN 5; 325 MG/1; MG/1
TABLET ORAL
COMMUNITY
Start: 2023-06-17
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
## 2023-08-11 ENCOUNTER — OFFICE VISIT (OUTPATIENT)
Dept: OBGYN CLINIC | Facility: CLINIC | Age: 63
End: 2023-08-11
Payer: COMMERCIAL

## 2023-08-11 ENCOUNTER — APPOINTMENT (OUTPATIENT)
Dept: RADIOLOGY | Facility: CLINIC | Age: 63
End: 2023-08-11
Payer: COMMERCIAL

## 2023-08-11 VITALS
BODY MASS INDEX: 27.9 KG/M2 | WEIGHT: 206 LBS | SYSTOLIC BLOOD PRESSURE: 130 MMHG | DIASTOLIC BLOOD PRESSURE: 83 MMHG | HEART RATE: 68 BPM | HEIGHT: 72 IN
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
## 2023-08-11 DIAGNOSIS — M24.811 INTERNAL DERANGEMENT OF RIGHT SHOULDER: ICD-10-CM

## 2023-08-11 DIAGNOSIS — M25.511 ACUTE PAIN OF RIGHT SHOULDER: ICD-10-CM

## 2023-08-11 DIAGNOSIS — M75.101 TEAR OF RIGHT ROTATOR CUFF, UNSPECIFIED TEAR EXTENT, UNSPECIFIED WHETHER TRAUMATIC: ICD-10-CM

## 2023-08-11 DIAGNOSIS — M25.511 RIGHT SHOULDER PAIN, UNSPECIFIED CHRONICITY: ICD-10-CM

## 2023-08-11 PROCEDURE — 99204 OFFICE O/P NEW MOD 45 MIN: CPT | Performed by: ORTHOPAEDIC SURGERY

## 2023-08-11 PROCEDURE — 73030 X-RAY EXAM OF SHOULDER: CPT
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
## 2023-09-01 ENCOUNTER — HOSPITAL ENCOUNTER (OUTPATIENT)
Dept: RADIOLOGY | Facility: HOSPITAL | Age: 63
Discharge: HOME | End: 2023-09-01
Attending: ORTHOPAEDIC SURGERY
Payer: COMMERCIAL

## 2023-09-01 DIAGNOSIS — M75.101 TEAR OF RIGHT ROTATOR CUFF, UNSPECIFIED TEAR EXTENT, UNSPECIFIED WHETHER TRAUMATIC: ICD-10-CM

## 2023-09-01 DIAGNOSIS — M24.811 INTERNAL DERANGEMENT OF RIGHT SHOULDER: ICD-10-CM

## 2023-09-01 DIAGNOSIS — M25.511 ACUTE PAIN OF RIGHT SHOULDER: ICD-10-CM
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
## 2023-09-01 PROCEDURE — 73221 MRI JOINT UPR EXTREM W/O DYE: CPT

## 2023-09-01 PROCEDURE — G1004 CDSM NDSC: HCPCS

## 2023-09-11 ENCOUNTER — OFFICE VISIT (OUTPATIENT)
Dept: OBGYN CLINIC | Facility: CLINIC | Age: 63
End: 2023-09-11
Payer: COMMERCIAL

## 2023-09-11 VITALS
HEIGHT: 72 IN | SYSTOLIC BLOOD PRESSURE: 135 MMHG | WEIGHT: 207.2 LBS | DIASTOLIC BLOOD PRESSURE: 85 MMHG | BODY MASS INDEX: 28.06 KG/M2 | HEART RATE: 67 BPM

## 2023-09-11 DIAGNOSIS — S46.011A ROTATOR CUFF STRAIN, RIGHT, INITIAL ENCOUNTER: ICD-10-CM

## 2023-09-11 DIAGNOSIS — S46.011S ROTATOR CUFF STRAIN, RIGHT, SEQUELA: ICD-10-CM

## 2023-09-11 PROCEDURE — 99212 OFFICE O/P EST SF 10 MIN: CPT | Performed by: ORTHOPAEDIC SURGERY
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
## 2024-05-06 ENCOUNTER — OFFICE VISIT (OUTPATIENT)
Dept: URGENT CARE | Facility: CLINIC | Age: 64
End: 2024-05-06
Payer: COMMERCIAL

## 2024-05-06 VITALS
HEIGHT: 72 IN | HEART RATE: 70 BPM | OXYGEN SATURATION: 97 % | BODY MASS INDEX: 29.31 KG/M2 | SYSTOLIC BLOOD PRESSURE: 138 MMHG | TEMPERATURE: 97.9 F | RESPIRATION RATE: 14 BRPM | WEIGHT: 216.4 LBS | DIASTOLIC BLOOD PRESSURE: 86 MMHG

## 2024-05-06 DIAGNOSIS — B96.89 ACUTE BACTERIAL BRONCHITIS: Primary | ICD-10-CM

## 2024-05-06 DIAGNOSIS — J20.8 ACUTE BACTERIAL BRONCHITIS: Primary | ICD-10-CM

## 2024-05-06 PROCEDURE — 99203 OFFICE O/P NEW LOW 30 MIN: CPT | Performed by: PHYSICIAN ASSISTANT

## 2024-05-06 RX ORDER — AZITHROMYCIN 250 MG/1
TABLET, FILM COATED ORAL
Qty: 6 TABLET | Refills: 0 | Status: SHIPPED | OUTPATIENT
Start: 2024-05-06 | End: 2024-05-10

## 2024-05-06 NOTE — PATIENT INSTRUCTIONS
Continue to monitor symptoms.  If new or worsening symptoms develop, go immediately to Er. Drink plenty of fluids.  Follow up with Family Doctor this week.    If tests are performed, our office will contact you with results only if changes need to made to the care plan discussed with you at the visit. You can review your full results on [facility]'s Mychart.     Acute Bronchitis   WHAT YOU NEED TO KNOW:   Acute bronchitis is swelling and irritation in your lungs. It is usually caused by a virus and most often happens in the winter. Bronchitis may also be caused by bacteria or by a chemical irritant, such as smoke.  DISCHARGE INSTRUCTIONS:   Return to the emergency department if:   You cough up blood.    Your lips or fingernails turn blue.    You feel like you are not getting enough air when you breathe.    Call your doctor if:   Your symptoms do not go away or get worse, even after treatment.    Your cough does not get better within 4 weeks.    You have questions or concerns about your condition or care.    Medicines:  You may need any of the following:  Cough suppressants  decrease your urge to cough.    Decongestants  help loosen mucus in your lungs and make it easier to cough up. This can help you breathe easier.    Inhalers  may be given. Your healthcare provider may give you one or more inhalers to help you breathe easier and cough less. An inhaler gives you medicine to open your airways. Ask your healthcare provider to show you how to use your inhaler correctly.         Antiviral medicine  treats infections caused by a virus.    Antibiotics  may be given if your bronchitis is caused by bacteria or if you have lung condition.    Acetaminophen  decreases pain and fever. It is available without a doctor's order. Ask how much to take and how often to take it. Follow directions. Read the labels of all other medicines you are using to see if they also contain acetaminophen, or ask your doctor or pharmacist.  Acetaminophen can cause liver damage if not taken correctly.    NSAIDs  help decrease swelling and pain or fever. This medicine is available with or without a doctor's order. NSAIDs can cause stomach bleeding or kidney problems in certain people. If you take blood thinner medicine, always ask your healthcare provider if NSAIDs are safe for you. Always read the medicine label and follow directions.    Self-care:   Drink liquids as directed.  You may need to drink more liquids than usual to stay hydrated. Ask how much liquid to drink each day and which liquids are best for you.    Use a cool mist humidifier.  This increases air moisture in your home. This may make it easier for you to breathe and help decrease your cough.     Get more rest.  Rest helps your body to heal. Slowly start to do more each day. Rest when you feel it is needed.    Prevent acute bronchitis:       Ask about vaccines you may need.  Get a flu vaccine each year as soon as recommended, usually in September or October. Ask your healthcare provider if you should also get a pneumonia or COVID-19 vaccine. Your healthcare provider can tell you if you should also get other vaccines, and when to get them.    Prevent the spread of germs.  You can decrease your risk for acute bronchitis and other illnesses by doing the following:     Wash your hands often with soap and water. Carry germ-killing hand lotion or gel with you. You can use the lotion or gel to clean your hands when soap and water are not available.         Do not touch your eyes, nose, or mouth unless you have washed your hands first.    Always cover your mouth when you cough to prevent the spread of germs. It is best to cough into a tissue or your shirt sleeve instead of into your hand. Ask those around you to cover their mouths when they cough.    Try to avoid people who have a cold or the flu. If you are sick, stay away from others as much as possible.    Avoid irritants in the air.  Avoid  chemicals, fumes, and dust. Wear a face mask if you must work around dust or fumes. Stay inside on days when air pollution levels are high. If you have allergies, stay inside when pollen counts are high. Do not use aerosol products, such as spray-on deodorant, bug spray, and hair spray.    Do not smoke or be around others who are smoking.  Nicotine and other chemicals in cigarettes and cigars can cause lung damage. Ask your healthcare provider for information if you currently smoke and need help to quit. E-cigarettes or smokeless tobacco still contain nicotine. Talk to your healthcare provider before you use these products.  Follow up with your doctor as directed:  Write down questions you have so you will remember to ask them during your follow-up visits.  © Copyright [employer] 2023 Information is for End User's use only and may not be sold, redistributed or otherwise used for commercial purposes.  The above information is an  only. It is not intended as medical advice for individual conditions or treatments. Talk to your doctor, nurse or pharmacist before following any medical regimen to see if it is safe and effective for you.

## 2024-05-06 NOTE — PROGRESS NOTES
[facility]        NAME: Francisco Javier Payan is a 63 y.o. male  : 1960    MRN: [id number]  DATE: May 6, 2024  TIME: 11:59 AM    Assessment and Plan   Acute bacterial bronchitis [J20.8, B96.89]  1. Acute bacterial bronchitis  azithromycin (ZITHROMAX) 250 mg tablet        Pt appears well.  VSS. NAD. Sx ongoing > 2 weeks.  I educated pt on supportive convervative care.  If sx do not improve in next 72 hour with conservative management recommend starting Zpack.    Patient Instructions       Follow up with PCP in 3-5 days.  Proceed to  ER if symptoms worsen.    If tests are performed, our office will contact you with results only if changes need to made to the care plan discussed with you at the visit. You can review your full results on [facility].    Chief Complaint     Chief Complaint   Patient presents with    Cough     Pt reports cough, chest and nasal congestion, 2x weeks         History of Present Illness       Cough  This is a new problem. Episode onset: > 2 weeks ago. The problem has been gradually worsening. The problem occurs every few minutes. The cough is Productive of brown sputum. Associated symptoms include nasal congestion, postnasal drip and a sore throat. Pertinent negatives include no chest pain, chills, ear pain, fever, headaches, rash, rhinorrhea, shortness of breath or wheezing. Nothing aggravates the symptoms. Risk factors: Never smoker. Treatments tried: Mucinex. The treatment provided mild relief. There is no history of asthma.       Review of Systems   Review of Systems   Constitutional:  Negative for chills, diaphoresis, fatigue and fever.   HENT:  Positive for postnasal drip, sinus pressure, sinus pain and sore throat. Negative for congestion, ear pain, rhinorrhea, sneezing and trouble swallowing.    Eyes: Negative.    Respiratory:  Positive for cough. Negative for chest tightness, shortness of breath and wheezing.    Cardiovascular: Negative.  Negative for chest pain and  palpitations.   Gastrointestinal:  Negative for abdominal pain, diarrhea, nausea and vomiting.   Endocrine: Negative.    Genitourinary:  Negative for dysuria.   Musculoskeletal: Negative.    Skin:  Negative for rash.   Allergic/Immunologic: Negative.    Neurological: Negative.  Negative for light-headedness and headaches.   Hematological: Negative.    Psychiatric/Behavioral: Negative.           Current Medications       Current Outpatient Medications:     azithromycin (ZITHROMAX) 250 mg tablet, Take 2 tablets today then 1 tablet daily x 4 days, Disp: 6 tablet, Rfl: 0    acetaminophen (TYLENOL) 500 mg tablet, Take 500 mg by mouth every 6 (six) hours as needed for mild pain (Patient not taking: Reported on 8/11/2023), Disp: , Rfl:     cephalexin (KEFLEX) 500 mg capsule, Take 500 mg by mouth every 12 (twelve) hours (Patient not taking: Reported on 8/11/2023), Disp: , Rfl:     HYDROcodone-acetaminophen (NORCO) 5-325 mg per tablet, TAKE ONE TABLET BY MOUTH EVERY 12 HOURS AS NEEDED FOR PAIN MAXIMUM DAILY DOSE = TWO TABLETS (Patient not taking: Reported on 8/11/2023), Disp: , Rfl:     multivitamin (THERAGRAN) TABS, Take 1 tablet by mouth daily (Patient not taking: Reported on 8/11/2023), Disp: , Rfl:     naproxen sodium (ALEVE) 220 MG tablet, Take 1 tablet by mouth 3 (three) times a day as needed (Patient not taking: Reported on 8/11/2023), Disp: , Rfl:     sildenafil (Viagra) 100 mg tablet, Take 1 tablet (100 mg total) by mouth daily as needed for erectile dysfunction (Patient not taking: Reported on 8/11/2023), Disp: 10 tablet, Rfl: 5    Current Allergies     Allergies as of 05/06/2024    (No Known Allergies)            The following portions of the patient's history were reviewed and updated as appropriate: allergies, current medications, past family history, past medical history, past social history, past surgical history and problem list.     Past Medical History:   Diagnosis Date    Basal cell carcinoma     basal  cell skin cancer    Cervical radiculopathy     with numbness right arm-chronic issue-pinched nerve    Colon polyp     Lateral epicondylitis 05/04/2005    Varicosities of leg        Past Surgical History:   Procedure Laterality Date    COLONOSCOPY      HERNIA REPAIR Left     inguinal    VT RPR 1ST INGUN HRNA AGE 6 YRS/> REDUCIBLE Left 4/9/2019    Procedure: REPAIR HERNIA INGUINAL;  Surgeon: Kentrell Rob MD;  Location: [facility];  Service: General    SKIN BIOPSY      multiple basal cell cancers removed-local anesthesia       Family History   Problem Relation Age of Onset    Lung cancer Father     Hypertension Father     Other Father         nicotine abuse    Cancer Father         lung    Heart attack Maternal Grandfather          Medications have been verified.        Objective   /86   Pulse 70   Temp 97.9 °F (36.6 °C)   Resp 14   Ht 6' (1.829 m)   Wt 98.2 kg (216 lb 6.4 oz)   SpO2 97%   BMI 29.35 kg/m²        Physical Exam     Physical Exam  Vitals and nursing note reviewed.   Constitutional:       General: He is not in acute distress.     Appearance: Normal appearance. He is well-developed. He is not ill-appearing or diaphoretic.   HENT:      Head: Normocephalic and atraumatic.      Right Ear: External ear normal.      Left Ear: External ear normal.      Nose: Congestion present. No rhinorrhea.      Mouth/Throat:      Pharynx: Posterior oropharyngeal erythema present. No oropharyngeal exudate.   Eyes:      General:         Right eye: No discharge.         Left eye: No discharge.      Conjunctiva/sclera: Conjunctivae normal.   Cardiovascular:      Rate and Rhythm: Normal rate and regular rhythm.      Heart sounds: Normal heart sounds.   Pulmonary:      Effort: Pulmonary effort is normal. No respiratory distress.      Breath sounds: Normal breath sounds. No wheezing, rhonchi or rales.   Musculoskeletal:      Cervical back: Normal range of motion and neck supple.   Lymphadenopathy:      Cervical: No  cervical adenopathy.   Skin:     General: Skin is warm.      Capillary Refill: Capillary refill takes less than 2 seconds.      Findings: No rash.   Neurological:      Mental Status: He is alert.

## 2025-02-20 ENCOUNTER — OFFICE VISIT (OUTPATIENT)
Dept: OBGYN CLINIC | Facility: CLINIC | Age: 65
End: 2025-02-20
Payer: COMMERCIAL

## 2025-02-20 ENCOUNTER — APPOINTMENT (OUTPATIENT)
Dept: RADIOLOGY | Facility: CLINIC | Age: 65
End: 2025-02-20
Payer: COMMERCIAL

## 2025-02-20 VITALS — WEIGHT: 216 LBS | BODY MASS INDEX: 29.26 KG/M2 | HEIGHT: 72 IN

## 2025-02-20 DIAGNOSIS — M47.22 OSTEOARTHRITIS OF SPINE WITH RADICULOPATHY, CERVICAL REGION: Primary | ICD-10-CM

## 2025-02-20 DIAGNOSIS — M54.2 NECK PAIN: ICD-10-CM

## 2025-02-20 PROCEDURE — 72040 X-RAY EXAM NECK SPINE 2-3 VW: CPT

## 2025-02-20 PROCEDURE — 99213 OFFICE O/P EST LOW 20 MIN: CPT | Performed by: ORTHOPAEDIC SURGERY

## 2025-02-20 NOTE — PROGRESS NOTES
Assessment/Plan:  1. Osteoarthritis of spine with radiculopathy, cervical region  XR spine cervical 2 or 3 vw injury    Ambulatory Referral to Physical Therapy        Scribe Attestation      I,:  Ferdinand Moffett am acting as a scribe while in the presence of the attending physician.:       I,:  Zeeshan Phipps, DO personally performed the services described in this documentation    as scribed in my presence.:               Francisco Javier and I reviewed his x-rays together.  He has degenerative changes at C5-C6 and C6-C7.  He is quite limited in motion with very little right rotation and flexion.  It is likely that the arthritis is acting the facets.  He is also presenting with radicular symptoms.  I would like him to begin a course of formal physical therapy for the next 4 to 6 weeks.  In physical therapy they will work on regaining his motion of the cervical spine.  He will follow-up with me in 6 weeks.  If there is little or no improvement and he continues to have radicular symptoms I will order an MRI of the cervical spine.  If the MRI reveals impingement I will likely refer to spine and pain management.    Subjective:   Francisco Javier Payan is a 64 y.o. male who presents today for chronic right-sided neck pain and stiffness.  He states he has noted a decrease in range of motion over time that has increased lately.  He states he has difficulty looking to his right.  He enjoys riding a motorcycle and he is concerned that he must turn his entire body to look to the right for oncoming traffic.  This causes safety concerns for him.  He also complains of an intermittent numbness sensation that radiates through the right arm.  He will have pain radiating to the right trapezius.  This will occur several times through the day.  He will simply change the motion of his neck which will relieve his symptoms.  He states he has a known history of osteoarthritis diagnosed by Dr. Leung many years ago.  This was evident on x-rays from 2021.  He  does not make any intense to street his neck such as applying heat or ice or topical creams.  He states the neck does feel better after a warm shower.      Review of Systems   Constitutional:  Negative for chills, fever and unexpected weight change.   HENT:  Negative for hearing loss, nosebleeds and sore throat.    Eyes:  Negative for pain, redness and visual disturbance.   Respiratory:  Negative for cough, shortness of breath and wheezing.    Cardiovascular:  Negative for chest pain, palpitations and leg swelling.   Gastrointestinal:  Negative for abdominal pain, nausea and vomiting.   Endocrine: Negative for polyphagia and polyuria.   Genitourinary:  Negative for dysuria and hematuria.   Musculoskeletal:         See HPI   Skin:  Negative for rash and wound.   Neurological:  Negative for dizziness, numbness and headaches.   Psychiatric/Behavioral:  Negative for decreased concentration and suicidal ideas. The patient is not nervous/anxious.          Past Medical History:   Diagnosis Date    Basal cell carcinoma     basal cell skin cancer    Cervical radiculopathy     with numbness right arm-chronic issue-pinched nerve    Colon polyp     Lateral epicondylitis 05/04/2005    Varicosities of leg        Past Surgical History:   Procedure Laterality Date    COLONOSCOPY      HERNIA REPAIR Left     inguinal    PA RPR 1ST INGUN HRNA AGE 5 YRS/> REDUCIBLE Left 4/9/2019    Procedure: REPAIR HERNIA INGUINAL;  Surgeon: Kentrell Rob MD;  Location: [facility];  Service: General    SKIN BIOPSY      multiple basal cell cancers removed-local anesthesia       Family History   Problem Relation Age of Onset    Lung cancer Father     Hypertension Father     Other Father         nicotine abuse    Cancer Father         lung    Heart attack Maternal Grandfather        Social History     Occupational History    Not on file   Tobacco Use    Smoking status: Never    Smokeless tobacco: Never   Vaping Use    Vaping status: Never Used    Substance and Sexual Activity    Alcohol use: Yes     Alcohol/week: 14.0 standard drinks of alcohol     Types: 14 Cans of beer per week    Drug use: Not Currently    Sexual activity: Not on file         Current Outpatient Medications:     acetaminophen (TYLENOL) 500 mg tablet, Take 500 mg by mouth every 6 (six) hours as needed for mild pain (Patient not taking: Reported on 8/11/2023), Disp: , Rfl:     cephalexin (KEFLEX) 500 mg capsule, Take 500 mg by mouth every 12 (twelve) hours (Patient not taking: Reported on 8/11/2023), Disp: , Rfl:     HYDROcodone-acetaminophen (NORCO) 5-325 mg per tablet, TAKE ONE TABLET BY MOUTH EVERY 12 HOURS AS NEEDED FOR PAIN MAXIMUM DAILY DOSE = TWO TABLETS (Patient not taking: Reported on 8/11/2023), Disp: , Rfl:     multivitamin (THERAGRAN) TABS, Take 1 tablet by mouth daily (Patient not taking: Reported on 8/11/2023), Disp: , Rfl:     naproxen sodium (ALEVE) 220 MG tablet, Take 1 tablet by mouth 3 (three) times a day as needed (Patient not taking: Reported on 8/11/2023), Disp: , Rfl:     sildenafil (Viagra) 100 mg tablet, Take 1 tablet (100 mg total) by mouth daily as needed for erectile dysfunction (Patient not taking: Reported on 8/11/2023), Disp: 10 tablet, Rfl: 5    No Known Allergies    Objective:  There were no vitals filed for this visit.    Right Hand Exam     Tests   Phalen’s Sign: negative  Tinel's sign (median nerve): negative          Tests     Right Wrist/Hand   Negative Phalen's sign and Tinel's sign (medial nerve).     Cervical Spine Exam  There is tenderness in the right trapezius and paraspinal musculature.  Limited right rotation, right lateral flexion and extension  Normal sensation in the axillary, ulnar, median and radial nerve distributions.  5/5 strength EPL, APB and first dorsal interosseous  (+) Spurling's    Physical Exam  Vitals reviewed.   Constitutional:       Appearance: He is well-developed.   HENT:      Head: Normocephalic and atraumatic.   Eyes:       General:         Right eye: No discharge.         Left eye: No discharge.      Conjunctiva/sclera: Conjunctivae normal.   Cardiovascular:      Rate and Rhythm: Regular rhythm.   Pulmonary:      Effort: Pulmonary effort is normal. No respiratory distress.   Musculoskeletal:      Cervical back: Normal range of motion and neck supple.      Comments: As noted in the HPI.   Skin:     General: Skin is warm and dry.   Neurological:      Mental Status: He is alert and oriented to person, place, and time.   Psychiatric:         Behavior: Behavior normal.         I have personally reviewed pertinent films in PACS and my interpretation is as follows:  X-rays of cervical spine taken today show evidence of degenerative changes at level C5-C6, C6-C7.      This document was created using speech voice recognition software.   Grammatical errors, random word insertions, pronoun errors, and incomplete sentences are an occasional consequence of this system due to software limitations, ambient noise, and hardware issues.   Any formal questions or concerns about content, text, or information contained within the body of this dictation should be directly addressed to the provider for clarification.

## 2025-02-26 ENCOUNTER — EVALUATION (OUTPATIENT)
Dept: PHYSICAL THERAPY | Facility: CLINIC | Age: 65
End: 2025-02-26
Payer: COMMERCIAL

## 2025-02-26 DIAGNOSIS — M47.22 OSTEOARTHRITIS OF SPINE WITH RADICULOPATHY, CERVICAL REGION: Primary | ICD-10-CM

## 2025-02-26 DIAGNOSIS — M54.2 NECK PAIN: ICD-10-CM

## 2025-02-26 PROCEDURE — 97161 PT EVAL LOW COMPLEX 20 MIN: CPT

## 2025-02-26 PROCEDURE — 97530 THERAPEUTIC ACTIVITIES: CPT

## 2025-02-26 NOTE — PROGRESS NOTES
PT Evaluation   Today's date: 2025  Patient name: Francisco Javier Payan  : 1960  MRN: [id number]  Referring provider: Zeeshan Phipps DO  Dx:   Encounter Diagnosis     ICD-10-CM    1. Osteoarthritis of spine with radiculopathy, cervical region  M47.22 Ambulatory Referral to Physical Therapy      2. Neck pain  M54.2               Assessment  Assessment details: Patient is a 64 y.o. Male who presents with referring diagnosis of osteoarthritis of spine with radiculopathy, cervical region. Patient's greatest concern is fear of not being able to keep active and future ill health (and wanting to prevent it).    Primary movement impairment diagnosis of neck pain with mobility deficits resulting in pathoanatomical symptoms of restricted range of motion, movement coordination deficits, neural tension, and functional limitations. The aforementioned impairments have limited the patient's ability to look over right shoulder, drive without restriction. No further referral appears necessary at this time based upon examination results.    Patient education performed during today's session included: HEP consisting of upper cervical SNAGs (extension, R rotation) and seated thoracic extension    Primary movement impairments:  1) Mobility deficits  2) Neural tension    Etiological factors include: none recalled by patient        Impairments: Abnormal coordination, Abnormal muscle tone, Abnormal or restricted ROM, Activity intolerance, Impaired physical strength, Lacks appropriate HEP, Poor posture, Poor body mechanics, and Abnormal muscle firing  Understanding of Dx/Px/POC: Good  Prognosis: Good   Positive prognostic factors: motivation for improvement   Negative prognostic factors: chronicity of symptoms, comorbid conditions    Patient verbalized understanding of POC.         Please contact me if you have any questions or recommendations. Thank you for the  referral and the opportunity to share in Francisco Javier Payan's care.        Plan  Patient would benefit from: PT Eval and Skilled PT  Planned modality interventions: Biofeedback, Cryotherapy, TENS, Thermotherapy: Hydrocollator Packs, and Traction  Planned therapy interventions: Abdominal trunk stabilization, ADL training, Balance/WB training, Body mechanics training, Coordination, Dry Needling, Functional ROM exercises, HEP, Joint mobilization, Manual therapy, [place] taping, Motor coordination training, Neuromuscular re-education, Patient education, Postural training, Strengthening, Stretching, Therapeutic activities, Therapeutic exercises, and Activity modification  Frequency: 2x/wk  Duration in weeks: 8  Plan of Care beginning date: 2/26/2025  Plan of Care expiration date: 8 weeks - 4/23/2025  Treatment plan discussed with: Patient       Goals  Short Term Goals (4 weeks):    - Patient will be independent in basic HEP 2-3 weeks  - Patient will report >50% reduction in pain  - Patient will demonstrate >1/3 improvement in MMT grade as applicable  - Patient will demo min to mod restriction in all c/s planes of motion as applicable  - Patient will demo centralization of R hand sx    Long Term Goals (8 weeks):  - Patient will be independent in a comprehensive home exercise program  - Patient FOTO score will improve >10 points  - Patient will self-report >75% improvement in function  - Patient will deny pain while driving  - Patient will deny pain with looking over his shoulder      Subjective    History of Present Illness  - Mechanism of injury: Patient reports to physical therapy with right sided neck pain that began in insidiously over the last 6 months to a year. No RAMA. Reports greatest difficulty with looking over his right. Denies pain with looking up, down, and over left shoulder.    States pain is localized from base of skull into superior shoulder. Gets numbness and cramping into R UE, including into hand,  primarily into digits 2-5, which has been present for years. Was told he had pinched nerve at the time. States he gets numbness when riding his motorcycle.    Patient is right hand hand dominant.    Works 1x/week as . Denies symptoms with reaching overhead.    States he does get occipital headaches occasionally when symptoms are bad. Denies changes in hearing, vision.    Patient goals: no pain while looking over his shoulder, drive pain free        Pain  - Current pain ratin/10  - At best pain ratin/10  - At worst pain ratin/10      Objective     Postural Assessment  -Posture in Sitting: forward head/shoulder    Sensation  - Light touch: grossly intact and equal bilaterally  - Upper Motor Neuron Signs: negative UE clonus bilat, negative rae bilat    UE MMT  LEFT  RIGHT  - Shoulder Shrug (C4): 5/5  5/5  - Shoulder Abduction (C5):  5/5  5/5   - Elbow Flexion (C6):  4+/5  5/5  - Elbow Extension (C7): 4+/5  5/5  - Thumb Extension (C8): 5/5  5/5  - Finger Adduction (T1):  5/5  5/5    Cervical Spine Range of Motion  Flexion:  Minimally limited  without pain  Extension:  Moderately limited  without pain  Lateral Flexion - Left:  Severely limited  without pain  Lateral Flexion - Right:  Severely limited  with pain  Rotation - Left:  Moderately limited  without pain  Rotation - Right:  Severely limited  with pain    Thoracic Spine Range of Motion  Extension:  Moderately limited  without pain  Rotation - Left:  Moderately limited  without pain  Rotation - Right:  Moderately limited  without pain    Mechanical Assessment  In Standing:  - Retraction: No Effect  - Retraction + Extension: Produced pain about suboccipitals    Joint Play  OA: Hypomobile  AA: Hypomobile  C2: Hypomobile  C3: Hypomobile  C4: Hypomobile  C5: Hypomobile  C6: Hypomobile  C7: Hypomobile  CTJ: Hypomobile  Mid-Thoracic Spine: Hypomobile    Diagnostic Tests Performed  Positive: Spurling A, ULTT 1A (median nerve), and Cervical  flexion-rotation  Negative: Alar Ligament, Sharp Isacc, and Spurling B             Insurance Eval/ Re-eval POC expires Auth Status Total visits  Start date  Expiration date Debbie   Jo BC 2/26/25 4/23/25 SUBMITTED    $25 co-pay  36 PCY                 Date 2/26/25        Visit Number 1        Auth 1 / TBD                 Manual         Upglides t/o cervical spine, R > L         SNAGs R rotation, upper cervical                                    TherEx         Upper cervical SNAG Extension, rev for HEP    R Rot, rev for HEP        Seated t/s ext Rev for HEP        Open books         4-finger rotation         T/S mobility over half roll  Bear hugs, OH reach                                  Neuro Re-Ed         Median nerve glides         Wall slides + lift off                                                               TherAct          Pt edu, PT POC, HEP                                            Gait Training                                    Modalities         CP               Precautions:   Past Medical History:   Diagnosis Date    Basal cell carcinoma     basal cell skin cancer    Cervical radiculopathy     with numbness right arm-chronic issue-pinched nerve    Colon polyp     Lateral epicondylitis 05/04/2005    Varicosities of leg

## 2025-03-03 ENCOUNTER — OFFICE VISIT (OUTPATIENT)
Dept: PHYSICAL THERAPY | Facility: CLINIC | Age: 65
End: 2025-03-03
Payer: COMMERCIAL

## 2025-03-03 DIAGNOSIS — M47.22 OSTEOARTHRITIS OF SPINE WITH RADICULOPATHY, CERVICAL REGION: Primary | ICD-10-CM

## 2025-03-03 DIAGNOSIS — M54.2 NECK PAIN: ICD-10-CM

## 2025-03-03 PROCEDURE — 97140 MANUAL THERAPY 1/> REGIONS: CPT

## 2025-03-03 PROCEDURE — 97110 THERAPEUTIC EXERCISES: CPT

## 2025-03-03 NOTE — PROGRESS NOTES
"Daily Note     Today's date: 3/4/2025  Patient name: Francisco Javier Payan  : 1960  MRN: [id number]  Referring provider: Zeeshan Phipps DO  Dx:   Encounter Diagnosis     ICD-10-CM    1. Osteoarthritis of spine with radiculopathy, cervical region  M47.22       2. Neck pain  M54.2           Start Time: 1330  Stop Time: 1410  Total time in clinic (min): 40 minutes    Subjective: Patient reports continued pain with looking to the right since previous session. States he gets short term relief of symptoms with HEP.      Objective: See treatment diary below      Assessment: Tolerated treatment well. Patient demo closing restriction about CTJ on right. Emphasis placed on mobility-based interventions as a result. Patient encouraged to add open books to HEP to address rotational component to sx, pt verbalized good understanding/agreement. Patient will continue to benefit from skilled PT to improve functional mobility and activity tolerance.      Plan: Continue per plan of care.        Insurance Eval/ Re-eval POC expires Auth Status Total visits  Start date  [facility] date [facility]  $25 co-pay  36 PCY                 Date 2/26/25 3/3/25       Visit Number 1 2       Auth 1 / TBD                 Manual         Upglides t/o cervical spine, R > L  Gr II-IV performed       SNAGs R rotation, upper cervical           Prone PA mobs to upper and mid t/s, R > L gr II-IV performed                         TherEx         Upper cervical SNAG Extension, rev for HEP    R Rot, rev for HEP Extension x10    Right rot x10       Seated t/s ext Rev for HEP 5\" x20    Standing at wall 5\" x15       Open books  5\" x20 each       4-finger rotation         T/S mobility over half roll  Bear hugs x20    OH reach x20       Prone t/s ext  5\" x20                         Neuro Re-Ed         Median nerve glides         Wall slides + lift off                                                             "   TherAct          Pt edu, PT POC, HEP                                            Gait Training                                    Modalities         CP               Precautions:   Past Medical History:   Diagnosis Date    Basal cell carcinoma     basal cell skin cancer    Cervical radiculopathy     with numbness right arm-chronic issue-pinched nerve    Colon polyp     Lateral epicondylitis 05/04/2005    Varicosities of leg

## 2025-03-05 ENCOUNTER — OFFICE VISIT (OUTPATIENT)
Dept: PHYSICAL THERAPY | Facility: CLINIC | Age: 65
End: 2025-03-05
Payer: COMMERCIAL

## 2025-03-05 DIAGNOSIS — M54.2 NECK PAIN: ICD-10-CM

## 2025-03-05 DIAGNOSIS — M47.22 OSTEOARTHRITIS OF SPINE WITH RADICULOPATHY, CERVICAL REGION: Primary | ICD-10-CM

## 2025-03-05 PROCEDURE — 97110 THERAPEUTIC EXERCISES: CPT

## 2025-03-05 NOTE — PROGRESS NOTES
"Daily Note     Today's date: 3/5/2025  Patient name: Francisco Javier Payan  : 1960  MRN: [id number]  Referring provider: Zeeshan Phipps DO  Dx:   Encounter Diagnosis     ICD-10-CM    1. Osteoarthritis of spine with radiculopathy, cervical region  M47.22       2. Neck pain  M54.2           Start Time: 1010  Stop Time: 1050  Total time in clinic (min): 40 minutes    Subjective: Patient reports modest improvements in his ROM.      Objective: See treatment diary below      Assessment: Tolerated treatment well. Patient with significant reduction in right sided discomfort following manual distraction + retraction + extension, reinforced w/ seated passive retraction + extension + wiggle to good tolerance. Continued w/ promote t/s mobility t/o session. Patient will continue to benefit from skilled PT to improve functional mobility and activity tolerance.      Plan: Continue per plan of care.        Insurance Eval/ Re-eval POC expires Auth Status Total visits  Start date  Expiration date [facility]   [employer] 25 APPROVED  $25 co-pay  36 PCY                 Date 2/26/25 3/3/25 3/5/25      Visit Number 1 2 3      Auth 1 / TBD 2 / 12 3 / 12               Manual         Upglides t/o cervical spine, R > L  Gr II-IV performed       SNAGs R rotation, upper cervical           Prone PA mobs to upper and mid t/s, R > L gr II-IV performed                         TherEx         Mechanical Exam   Manual cervical distract + retract + ext + wiggle    Seated cervical passive retract + ext + wiggle 3x10      Upper cervical SNAG Extension, rev for HEP    R Rot, rev for HEP Extension x10    Right rot x10       Seated t/s ext Rev for HEP 5\" x20    Standing at wall 5\" x15 Piking in plantigrade 5\" x25      Open books  5\" x20 each 5\" x20 each      4-finger rotation         T/S mobility over half roll  Bear hugs x20    OH reach x20 Bear hugs x20    OH reach x20      Prone t/s ext  5\" x20 5\" x25                        Neuro " Re-Ed         Median nerve glides         Wall slides + lift off                                                               TherAct          Pt edu, PT POC, HEP                                            Gait Training                                    Modalities         CP               Precautions:   Past Medical History:   Diagnosis Date    Basal cell carcinoma     basal cell skin cancer    Cervical radiculopathy     with numbness right arm-chronic issue-pinched nerve    Colon polyp     Lateral epicondylitis 05/04/2005    Varicosities of leg

## 2025-03-10 ENCOUNTER — OFFICE VISIT (OUTPATIENT)
Dept: PHYSICAL THERAPY | Facility: CLINIC | Age: 65
End: 2025-03-10
Payer: COMMERCIAL

## 2025-03-10 DIAGNOSIS — M54.2 NECK PAIN: ICD-10-CM

## 2025-03-10 DIAGNOSIS — M47.22 OSTEOARTHRITIS OF SPINE WITH RADICULOPATHY, CERVICAL REGION: Primary | ICD-10-CM

## 2025-03-10 PROCEDURE — 97110 THERAPEUTIC EXERCISES: CPT

## 2025-03-10 NOTE — PROGRESS NOTES
"Daily Note     Today's date: 3/10/2025  Patient name: Francisco Javier Payan  : 1960  MRN: [id number]  Referring provider: Zeeshan Phipps DO  Dx:   Encounter Diagnosis     ICD-10-CM    1. Osteoarthritis of spine with radiculopathy, cervical region  M47.22       2. Neck pain  M54.2           Start Time: 1145  Stop Time: 1225  Total time in clinic (min): 40 minutes    Subjective: Patient reports modest improvements in cervical rotation since previous session.      Objective: See treatment diary below      Assessment: Tolerated treatment well. Continued to promote cervical and thoracic mobility during today's session. Challenged with initiation of additional mobility work in half kneel, particularly when performing rotation w/ inside arm. Initiated postural stab work to good tolerance, provided pt w/ intermittent vc to maintain technique and neutral head position. Patient will continue to benefit from skilled PT to improve functional mobility and activity tolerance.      Plan: Continue per plan of care.        Insurance Eval/ Re-eval POC expires Auth Status Total visits  Start date  Expiration date [facility] 25 APPROVED  $25 co-pay  36 PCY                 Date 2/26/25 3/3/25 3/5/25 3/10/25     Visit Number 1 2 3 4     Auth 1 / TBD 2 / 12 3 / 12 4 / 12              Manual         Upglides t/o cervical spine, R > L  Gr II-IV performed       SNAGs R rotation, upper cervical           Prone PA mobs to upper and mid t/s, R > L gr II-IV performed  R UPA upper/mid t/s performed gr II-IV                       TherEx         Mechanical Exam   Manual cervical distract + retract + ext + wiggle    Seated cervical passive retract + ext + wiggle 3x10 Manual cervical distract + retract + ext + wiggle     Upper cervical SNAG Extension, rev for HEP    R Rot, rev for HEP Extension x10    Right rot x10       Seated t/s ext Rev for HEP 5\" x20    Standing at wall 5\" x15 Piking in plantigrade 5\" x25    " "  Open books  5\" x20 each 5\" x20 each 5\" x20 each     4-finger rotation         T/S mobility over half roll  Bear hugs x20    OH reach x20 Bear hugs x20    OH reach x20      Prone t/s ext  CKC 5\" x20 CKC 5\" x25 Prone T/S ext (bat wings) 2x10     T/S mobility in half kneel    Open book 5\" x15    Inside arm rotation (rainbow) 5\" x15     Row    27.5# 3x10     D2 flexion bilat    GTB 3x10              Neuro Re-Ed         Median nerve glides         Wall slides + lift off                                                               TherAct          Pt edu, PT POC, HEP                                            Gait Training                                    Modalities         CP               Precautions:   Past Medical History:   Diagnosis Date    Basal cell carcinoma     basal cell skin cancer    Cervical radiculopathy     with numbness right arm-chronic issue-pinched nerve    Colon polyp     Lateral epicondylitis 05/04/2005    Varicosities of leg                   "

## 2025-03-12 ENCOUNTER — OFFICE VISIT (OUTPATIENT)
Dept: PHYSICAL THERAPY | Facility: CLINIC | Age: 65
End: 2025-03-12
Payer: COMMERCIAL

## 2025-03-12 DIAGNOSIS — M47.22 OSTEOARTHRITIS OF SPINE WITH RADICULOPATHY, CERVICAL REGION: Primary | ICD-10-CM

## 2025-03-12 DIAGNOSIS — M54.2 NECK PAIN: ICD-10-CM

## 2025-03-12 PROCEDURE — 97140 MANUAL THERAPY 1/> REGIONS: CPT

## 2025-03-12 PROCEDURE — 97112 NEUROMUSCULAR REEDUCATION: CPT

## 2025-03-12 PROCEDURE — 97110 THERAPEUTIC EXERCISES: CPT

## 2025-03-12 NOTE — PROGRESS NOTES
"Daily Note     Today's date: 3/12/2025  Patient name: Francisco Javier Payan  : 1960  MRN: [id number]  Referring provider: Zeeshan Phipps DO  Dx:   Encounter Diagnosis     ICD-10-CM    1. Osteoarthritis of spine with radiculopathy, cervical region  M47.22       2. Neck pain  M54.2           Start Time: 1145  Stop Time: 1225  Total time in clinic (min): 40 minutes    Subjective: Patient reports mild right sided stiffness at start of today's session.      Objective: See treatment diary below      Assessment: Tolerated treatment well. Increased emphasis placed on upper cervical mobility (R > L) with MT during today's session. Initiated prone work to promote postural stab, provided pt w/ graded vc to promote neutral head position and to limit compensatory UT activation. Initiation of nerve glides mary grace well overall. Patient will continue to benefit from skilled PT to improve functional mobility and activity tolerance.      Plan: Continue per plan of care.        Insurance Eval/ Re-eval POC expires Auth Status Total visits  Start date  Expiration date [facility] 25 APPROVED  $25 co-pay  36 PCY                 Date 2/26/25 3/3/25 3/5/25 3/10/25 3/12/25    Visit Number 1 2 3 4 5    Auth 1 / TBD 2 / 12 3 / 12 4 / 12 5 / 12             Manual         Upglides t/o cervical spine, R > L  Gr II-IV performed   C2-3 R UPA gr II-III    C2-3 gapping performed    SNAGs R rotation, upper cervical           Prone PA mobs to upper and mid t/s, R > L gr II-IV performed  R UPA upper/mid t/s performed gr II-IV                       TherEx         Mechanical Exam   Manual cervical distract + retract + ext + wiggle    Seated cervical passive retract + ext + wiggle 3x10 Manual cervical distract + retract + ext + wiggle R SCM/UT manual str performed    Upper cervical SNAG Extension, rev for HEP    R Rot, rev for HEP Extension x10    Right rot x10       Seated t/s ext Rev for HEP 5\" x20    Standing at wall 5\" x15 " "Piking in plantigrade 5\" x25      Open books  5\" x20 each 5\" x20 each 5\" x20 each 5\" x20 each    4-finger rotation         T/S mobility over half roll  Bear hugs x20    OH reach x20 Bear hugs x20    OH reach x20      Prone t/s ext  CKC 5\" x20 CKC 5\" x25 Prone T/S ext (bat wings) 2x10     T/S mobility in half kneel    Open book 5\" x15    Inside arm rotation (rainbow) 5\" x15 Open book 5\" x15    Inside arm rotation (rainbow) 5\" x15    Row    27.5# 3x10     D2 flexion bilat    GTB 3x10              Neuro Re-Ed         Median nerve glides     B shld abd w/ wrist flex/ext x10    Wall slides + lift off         Prone Y holds w/ dowel     5\" 3x5    Prone T     3x10                                        TherAct          Pt edu, PT POC, HEP                                            Gait Training                                    Modalities         CP               Precautions:   Past Medical History:   Diagnosis Date    Basal cell carcinoma     basal cell skin cancer    Cervical radiculopathy     with numbness right arm-chronic issue-pinched nerve    Colon polyp     Lateral epicondylitis 05/04/2005    Varicosities of leg                     "

## 2025-03-17 ENCOUNTER — OFFICE VISIT (OUTPATIENT)
Dept: PHYSICAL THERAPY | Facility: CLINIC | Age: 65
End: 2025-03-17
Payer: COMMERCIAL

## 2025-03-17 DIAGNOSIS — M47.22 OSTEOARTHRITIS OF SPINE WITH RADICULOPATHY, CERVICAL REGION: Primary | ICD-10-CM

## 2025-03-17 DIAGNOSIS — M54.2 NECK PAIN: ICD-10-CM

## 2025-03-17 PROCEDURE — 97140 MANUAL THERAPY 1/> REGIONS: CPT

## 2025-03-17 PROCEDURE — 97110 THERAPEUTIC EXERCISES: CPT

## 2025-03-17 NOTE — PROGRESS NOTES
"Daily Note     Today's date: 3/17/2025  Patient name: Francisco Javier Payan  : 1960  MRN: [id number]  Referring provider: Zeeshan Phipps DO  Dx:   Encounter Diagnosis     ICD-10-CM    1. Osteoarthritis of spine with radiculopathy, cervical region  M47.22       2. Neck pain  M54.2           Start Time: 1145  Stop Time: 1215  Total time in clinic (min): 30 minutes    Subjective: Patient states he cont to notice tightness on right side with rotation.      Objective: See treatment diary below      Assessment: Tolerated treatment well. Emphasis placed on postural stab during today's session to inhibit upper trap, promote MT/LT activation. Provided pt w/ intermittent vc to limit compensatory anterior humeral tipping and/or shrugging. Patient will cont to benefit from skilled PT to improve functional mobility and activity tolerance.      Plan: Continue per plan of care.  Mobility work, postural stab.       Insurance Eval/ Re-eval POC expires Auth Status Total visits  Start date  [facility]  [facility]  36 PCY                 Date 2/26/25 3/3/25 3/5/25 3/10/25 3/12/25 3/17/25   Visit Number 1 2 3 4 5 6   Auth 1 / TBD 2 / 12 3 / 12 4 / 12 5 / 12 6 / 12            Manual         Upglides t/o cervical spine, R > L  Gr II-IV performed   C2-3 R UPA gr II-III    C2-3 gapping performed C2-3 R UPA gr II-III    C2-3 gapping performed   SNAGs R rotation, upper cervical           Prone PA mobs to upper and mid t/s, R > L gr II-IV performed  R UPA upper/mid t/s performed gr II-IV                       TherEx         Mechanical Exam   Manual cervical distract + retract + ext + wiggle    Seated cervical passive retract + ext + wiggle 3x10 Manual cervical distract + retract + ext + wiggle R SCM/UT manual str performed    Upper cervical SNAG Extension, rev for HEP    R Rot, rev for HEP Extension x10    Right rot x10       Seated t/s ext Rev for HEP 5\" x20    Standing at wall 5\" " "x15 Piking in plantigrade 5\" x25      Open books  5\" x20 each 5\" x20 each 5\" x20 each 5\" x20 each 5\" x20 each   4-finger rotation         T/S mobility over half roll  Bear hugs x20    OH reach x20 Bear hugs x20    OH reach x20      Prone t/s ext  CKC 5\" x20 CKC 5\" x25 Prone T/S ext (bat wings) 2x10     T/S mobility in half kneel    Open book 5\" x15    Inside arm rotation (rainbow) 5\" x15 Open book 5\" x15    Inside arm rotation (rainbow) 5\" x15    Row    27.5# 3x10  27.5# 3x10   D2 flexion bilat    GTB 3x10     Scap depression in half kneel      17.5# 3x10 bilat   Stand B T      GTB 3x10            Neuro Re-Ed         Median nerve glides     B shld abd w/ wrist flex/ext x10 Manual radial and median nerve glides 3x15 each   Wall slides + lift off         Prone Y holds w/ dowel     5\" 3x5 5\" 3x5   Prone T     3x10 3# 3x10                                       TherAct          Pt edu, PT POC, HEP                                            Gait Training                                    Modalities         CP               Precautions:   Past Medical History:   Diagnosis Date    Basal cell carcinoma     basal cell skin cancer    Cervical radiculopathy     with numbness right arm-chronic issue-pinched nerve    Colon polyp     Lateral epicondylitis 05/04/2005    Varicosities of leg                       "

## 2025-03-19 ENCOUNTER — OFFICE VISIT (OUTPATIENT)
Dept: PHYSICAL THERAPY | Facility: CLINIC | Age: 65
End: 2025-03-19
Payer: COMMERCIAL

## 2025-03-19 DIAGNOSIS — M47.22 OSTEOARTHRITIS OF SPINE WITH RADICULOPATHY, CERVICAL REGION: Primary | ICD-10-CM

## 2025-03-19 DIAGNOSIS — M54.2 NECK PAIN: ICD-10-CM

## 2025-03-19 PROCEDURE — 97110 THERAPEUTIC EXERCISES: CPT

## 2025-03-19 PROCEDURE — 97140 MANUAL THERAPY 1/> REGIONS: CPT

## 2025-03-19 PROCEDURE — 97112 NEUROMUSCULAR REEDUCATION: CPT

## 2025-03-19 NOTE — PROGRESS NOTES
Daily Note     Today's date: 3/19/2025  Patient name: Francisco Javier Payan  : 1960  MRN: [id number]  Referring provider: Zeeshan Phipps DO  Dx:   Encounter Diagnosis     ICD-10-CM    1. Osteoarthritis of spine with radiculopathy, cervical region  M47.22       2. Neck pain  M54.2           Start Time: 1230  Stop Time: 1310  Total time in clinic (min): 40 minutes    Subjective: Patient with no new complaints in regards to neck. States he cont to experience tightness w/ R rotation, although pain free.      Objective: See treatment diary below      Assessment: Tolerated treatment well. Patient demo inc fatigability on right w/ postural stab progressions during today's session. Provided pt w/ intermittent verbal and tactile cues to limit compensatory UT activation and/or anterior humeral tipping. Restriction noted w/ upper cervical mobility on right compared to left. Pt progressing well towards previously set goals. Pt will continue to benefit from skilled PT to improve functional mobility and activity tolerance.      Plan: Continue per plan of care.        Insurance Eval/ Re-eval POC expires Auth Status Total visits  Start date  [facility]  $25 co-pay  36 PCY                 Date 3/3/25 3/5/25 3/10/25 3/12/25 3/17/25 3/19/25   Visit Number 2 3 4 5 6 7   Auth 2 / 12 3 / 12 4 / 12 5 / 12 6 / 12 7 / 12            Manual         Upglides t/o cervical spine, R > L Gr II-IV performed   C2-3 R UPA gr II-III    C2-3 gapping performed C2-3 R UPA gr II-III    C2-3 gapping performed C2-3 R UPA gr II-III    C2-3 gapping performed   SNAGs R rotation, upper cervical          Prone PA mobs to upper and mid t/s, R > L gr II-IV performed  R UPA upper/mid t/s performed gr II-IV                        TherEx         Mechanical Exam  Manual cervical distract + retract + ext + wiggle    Seated cervical passive retract + ext + wiggle 3x10 Manual cervical distract + retract + ext  "+ wiggle R SCM/UT manual str performed     Upper cervical SNAG Extension x10    Right rot x10        Seated t/s ext 5\" x20    Standing at wall 5\" x15 Piking in plantigrade 5\" x25       Open books 5\" x20 each 5\" x20 each 5\" x20 each 5\" x20 each 5\" x20 each    4-finger rotation         T/S mobility over half roll Bear hugs x20    OH reach x20 Bear hugs x20    OH reach x20       Prone t/s ext CKC 5\" x20 CKC 5\" x25 Prone T/S ext (bat wings) 2x10      T/S mobility in half kneel   Open book 5\" x15    Inside arm rotation (rainbow) 5\" x15 Open book 5\" x15    Inside arm rotation (rainbow) 5\" x15     Row   27.5# 3x10  27.5# 3x10 30.5# 3x10   Shld ext      20.5# 3x10   D2 flexion bilat   GTB 3x10      Scap depression in half kneel     17.5# 3x10 bilat 20.5# 3x10   Stand B T     GTB 3x10    Horizontal abduction      9# 3x10                     Neuro Re-Ed         Median nerve glides    B shld abd w/ wrist flex/ext x10 Manual radial and median nerve glides 3x15 each    Wall slides + lift off         Prone Y holds w/ dowel    5\" 3x5 5\" 3x5 5\" 2x10   Prone T    3x10 3# 3x10 3# 3x10-12                                       TherAct                                                      Gait Training                                    Modalities         CP               Precautions:   Past Medical History:   Diagnosis Date    Basal cell carcinoma     basal cell skin cancer    Cervical radiculopathy     with numbness right arm-chronic issue-pinched nerve    Colon polyp     Lateral epicondylitis 05/04/2005    Varicosities of leg                         "

## 2025-03-24 ENCOUNTER — OFFICE VISIT (OUTPATIENT)
Dept: PHYSICAL THERAPY | Facility: CLINIC | Age: 65
End: 2025-03-24
Payer: COMMERCIAL

## 2025-03-24 DIAGNOSIS — M47.22 OSTEOARTHRITIS OF SPINE WITH RADICULOPATHY, CERVICAL REGION: Primary | ICD-10-CM

## 2025-03-24 DIAGNOSIS — M54.2 NECK PAIN: ICD-10-CM

## 2025-03-24 PROCEDURE — 97112 NEUROMUSCULAR REEDUCATION: CPT

## 2025-03-24 PROCEDURE — 97110 THERAPEUTIC EXERCISES: CPT

## 2025-03-24 NOTE — PROGRESS NOTES
"Daily Note     Today's date: 3/24/2025  Patient name: Francisco Javier Payan  : 1960  MRN: [id number]  Referring provider: Zeeshan Phipps DO  Dx:   Encounter Diagnosis     ICD-10-CM    1. Osteoarthritis of spine with radiculopathy, cervical region  M47.22       2. Neck pain  M54.2           Start Time: 1145  Stop Time: 1215  Total time in clinic (min): 30 minutes    Subjective: Patient with no new complaints. Denies neck pain since previous session, although still restricted w/ R rotation.    Objective: See treatment diary below      Assessment: Tolerated treatment well. Patient demo improving scap control, able to perform interventions w/o compensatory UT activation. Inc fatigability noted on right compared to left w/ postural stab exercises. Pt progressing well towards previously set goals. Pt will continue to benefit from skilled PT to improve functional mobility and activity tolerance.      Plan: Continue per plan of care.        Insurance Eval/ Re-eval POC expires Auth Status Total visits  Start date  [facility]  $25 co-pay  36 PCY                 Date 3/5/25 3/10/25 3/12/25 3/17/25 3/19/25 3/24/25   Visit Number 3 4 5 6 7 8   Auth 3 / 12 4 / 12 5 / 12 6 / 12 7 / 12 8 / 12            Manual         Upglides t/o cervical spine, R > L   C2-3 R UPA gr II-III    C2-3 gapping performed C2-3 R UPA gr II-III    C2-3 gapping performed C2-3 R UPA gr II-III    C2-3 gapping performed    SNAGs R rotation, upper cervical           R UPA upper/mid t/s performed gr II-IV                         TherEx         Mechanical Exam Manual cervical distract + retract + ext + wiggle    Seated cervical passive retract + ext + wiggle 3x10 Manual cervical distract + retract + ext + wiggle R SCM/UT manual str performed      Upper cervical SNAG         Seated t/s ext Piking in plantigrade 5\" x25        Open books 5\" x20 each 5\" x20 each 5\" x20 each 5\" x20 each     4-finger " "rotation         T/S mobility over half roll Bear hugs x20    OH reach x20        Prone t/s ext CKC 5\" x25 Prone T/S ext (bat wings) 2x10       T/S mobility in half kneel  Open book 5\" x15    Inside arm rotation (rainbow) 5\" x15 Open book 5\" x15    Inside arm rotation (rainbow) 5\" x15   Open book 5\" x15    Inside arm rotation (rainbow) 5\" x15   Row  27.5# 3x10  27.5# 3x10 30.5# 3x10 Facepulls 19# 3x12-15   Shld ext     20.5# 3x10 20.5# 3x12-15   D2 flexion bilat  GTB 3x10       Scap depression in half kneel    17.5# 3x10 bilat 20.5# 3x10 22.5# 3x12-15   Stand B T    GTB 3x10     Horizontal abduction     9# 3x10 GTB 3x12-15                     Neuro Re-Ed         Median nerve glides   B shld abd w/ wrist flex/ext x10 Manual radial and median nerve glides 3x15 each     Wall slides + lift off      YTB 3x10   Prone Y holds w/ dowel   5\" 3x5 5\" 3x5 5\" 2x10    Prone T   3x10 3# 3x10 3# 3x10-12    Scap clocks      YTB, 3 points, 3x5 bilat                              TherAct                                                      Gait Training                                    Modalities         CP               Precautions:   Past Medical History:   Diagnosis Date    Basal cell carcinoma     basal cell skin cancer    Cervical radiculopathy     with numbness right arm-chronic issue-pinched nerve    Colon polyp     Lateral epicondylitis 05/04/2005    Varicosities of leg                           "

## 2025-03-26 ENCOUNTER — OFFICE VISIT (OUTPATIENT)
Dept: PHYSICAL THERAPY | Facility: CLINIC | Age: 65
End: 2025-03-26
Payer: COMMERCIAL

## 2025-03-26 DIAGNOSIS — M47.22 OSTEOARTHRITIS OF SPINE WITH RADICULOPATHY, CERVICAL REGION: Primary | ICD-10-CM

## 2025-03-26 DIAGNOSIS — M54.2 NECK PAIN: ICD-10-CM

## 2025-03-26 PROCEDURE — 97110 THERAPEUTIC EXERCISES: CPT

## 2025-03-26 PROCEDURE — 97112 NEUROMUSCULAR REEDUCATION: CPT

## 2025-03-26 NOTE — PROGRESS NOTES
Re-Evaluation    Today's date: 3/26/2025  Patient name: Francisco Javier Payan  : 1960  MRN: [id number]  Referring provider: Zeeshan Phipps DO  Dx:   Encounter Diagnosis     ICD-10-CM    1. Osteoarthritis of spine with radiculopathy, cervical region  M47.22       2. Neck pain  M54.2           Start Time: 1100  Stop Time: 1138  Total time in clinic (min): 38 minutes    Subjective: Patient states pain with looking over shoulder has improved since start of care. States his range of motion is still limited. States he continues to get tightness with right rotation, pain with side bend L > R. Has follow up with MD scheduled for next week, would like to return to PT after appt.    Goals  Short Term Goals (4 weeks):    - Patient will be independent in basic HEP 2-3 weeks - met  - Patient will report >50% reduction in pain - met  - Patient will demonstrate >1/3 improvement in MMT grade as applicable - met  - Patient will demo min to mod restriction in all c/s planes of motion as applicable - progressed  - Patient will demo centralization of R hand sx - met    Long Term Goals (8 weeks): - all progressed at [facility] on 3/26/25  - Patient will be independent in a comprehensive home exercise program  - Patient FOTO score will improve >10 points  - Patient will self-report >75% improvement in function  - Patient will deny pain while driving  - Patient will deny pain with looking over his shoulder      Objective: See treatment diary below    Postural Assessment (3/26/25: same)  -Posture in Sitting: forward head/shoulder    Sensation (3/26/25: same)  - Light touch: grossly intact and equal bilaterally  - Upper Motor Neuron Signs: negative UE clonus bilat, negative rae bilat    UE MMT (3/26/25: grossly 5/5 throughout)  LEFT  RIGHT  - Shoulder Shrug (C4): 5/5  5/5  - Shoulder Abduction (C5):  5/5  5/5   - Elbow Flexion (C6):  4+/5  5/5  - Elbow Extension (C7): 4+/5  5/5  - Thumb Extension (C8): 5/5  5/5  - Finger Adduction  (T1):  5/5  5/5    Cervical Spine Range of Motion  Flexion:  Minimally limited  without pain (3/26/25: minimally limited without pain)  Extension:  Moderately limited  without pain (3/26/25: moderately limited without pain)  Lateral Flexion - Left:  Severely limited  without pain (3/26/25: severely limited with tightness reported)  Lateral Flexion - Right:  Severely limited  with pain (3/26/25: severely limited with tightness reported/pain)  Rotation - Left:  Moderately limited  without pain (3/26/25: moderately limited without pain)  Rotation - Right:  Severely limited  with pain (3/26/25: moderately limited with tightness reported)    Thoracic Spine Range of Motion  Extension:  Moderately limited  without pain (3/26/25: moderately limited without pain)  Rotation - Left:  Moderately limited  without pain (3/26/25: moderately limited without pain)  Rotation - Right:  Moderately limited  without pain (3/26/25: moderately limited without pain)    Joint Play (3/26/25: grossly similar)  OA: Hypomobile  AA: Hypomobile  C2: Hypomobile  C3: Hypomobile  C4: Hypomobile  C5: Hypomobile  C6: Hypomobile  C7: Hypomobile  CTJ: Hypomobile  Mid-Thoracic Spine: Hypomobile    Diagnostic Tests Performed (3/26/25: positive spurling, improved ULTT)  Positive: Spurling A, ULTT 1A (median nerve), and Cervical flexion-rotation  Negative: Alar Ligament, Sharp Isacc, and Spurling B        Assessment: Patient has made gains in strength, range of motion, and functional mobility since starting physical therapy. He has demonstrated improvement in neural tension and radicular symptoms into R UE. He has also demonstrated improved irritability of symptoms with right rotation. Despite these gains, the patient continues to demonstrate significant restriction with side bend, limiting his ability to perform higher level activities. Additional improvement in SB and rotation noted w/ rib mobilization and inhibition of anterior cervical structures  "(luna, SILVIA). As the patient is progressing well towards previously set goals and with cont impairments, pt remains a strong candidate to continue to benefit from skilled PT services. PT POC and HEP were updated/reviewed during today's session, plan to wean to 1x/week as denoted below pending follow up with referring. Pt expressed no questions/concerns with updates made to POC.      Plan: Continue per plan of care.  PT POC: 1x/week for 6 weeks (3/26/25 - 5/7/25)       Insurance Eval/ Re-eval POC expires Auth Status Total visits  Start date  Expiration date [facility] [place]   Hzn BC 2/26/25 4/23/25 APPROVED 12 2/26 5/27 $25 co-pay  36 PCY    3/26 5/7 SUBMIT AT VISIT 10           Date 3/10/25 3/12/25 3/17/25 3/19/25 3/24/25 3/26/25   Visit Number [phone]    Auth 4 / 12 5 / 12 6 / 12 7 / 12 8 / 12 9 / 12            Manual         Upglides t/o cervical spine, R > L  C2-3 R UPA gr II-III    C2-3 gapping performed C2-3 R UPA gr II-III    C2-3 gapping performed C2-3 R UPA gr II-III    C2-3 gapping performed  1st/2nd R rib inf mobs gr II-IV performed   SNAGs R rotation, upper cervical          R UPA upper/mid t/s performed gr II-IV                          TherEx         Mechanical Exam Manual cervical distract + retract + ext + wiggle R SCM/UT manual str performed    Resisted cervical retraction YTB 3x10   Upper cervical SNAG         Seated t/s ext         Open books 5\" x20 each 5\" x20 each 5\" x20 each      4-finger rotation         T/S mobility over half roll         Prone t/s ext Prone T/S ext (bat wings) 2x10        T/S mobility in half kneel Open book 5\" x15    Inside arm rotation (rainbow) 5\" x15 Open book 5\" x15    Inside arm rotation (rainbow) 5\" x15   Open book 5\" x15    Inside arm rotation (rainbow) 5\" x15    Row 27.5# 3x10  27.5# 3x10 30.5# 3x10 Facepulls 19# 3x12-15    Shld ext    20.5# 3x10 20.5# 3x12-15    D2 flexion bilat GTB 3x10        Scap depression in half kneel   17.5# 3x10 bilat 20.5# 3x10 22.5# 3x12-15  " "  Stand B T   GTB 3x10      Horizontal abduction    9# 3x10 GTB 3x12-15    1st rib mobs w/ SOS      + L SB + flexion 2x10    + R shld flexion 2x10            Neuro Re-Ed         Median nerve glides  B shld abd w/ wrist flex/ext x10 Manual radial and median nerve glides 3x15 each   Supine chin tucks 5\" x20   Wall slides + lift off     YTB 3x10 YTB 3x10   Prone Y holds w/ dowel  5\" 3x5 5\" 3x5 5\" 2x10     Prone T  3x10 3# 3x10 3# 3x10-12     Scap clocks     YTB, 3 points, 3x5 bilat YTB, 3 points, 3x5 bilat                              TherAct                                                      Gait Training                                    Modalities         CP               Precautions:   Past Medical History:   Diagnosis Date    Basal cell carcinoma     basal cell skin cancer    Cervical radiculopathy     with numbness right arm-chronic issue-pinched nerve    Colon polyp     Lateral epicondylitis 05/04/2005    Varicosities of leg                             "

## 2025-04-03 ENCOUNTER — OFFICE VISIT (OUTPATIENT)
Dept: OBGYN CLINIC | Facility: CLINIC | Age: 65
End: 2025-04-03
Payer: COMMERCIAL

## 2025-04-03 DIAGNOSIS — M47.812 ARTHRITIS OF FACET JOINT OF CERVICAL SPINE: Primary | ICD-10-CM

## 2025-04-03 PROCEDURE — 99213 OFFICE O/P EST LOW 20 MIN: CPT | Performed by: ORTHOPAEDIC SURGERY

## 2025-04-03 NOTE — PROGRESS NOTES
Assessment/Plan:  1. 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  He does not have any obvious signs of cervical radiculopathy on examination today.  He has not improved with conservative measures of physical therapy at this point.  I recommended that he have a consultation with spine and pain to consider facet joint injection or ablation procedure.  He agreed with this plan.  Follow-up with spine pain going forward.      Subjective:   Francisco Javier Payan is a 64 y.o. male who presents to the office for follow-up for cervical spine pain . he is feeling discomfort in the right side of his neck that is chronic and stiff for many months.  We started him in physical therapy at last office visit that was concerning for arthritis.  He underwent physical therapy for over 6 weeks with minimal improvement he denies any numbness or tingling down his arms in any way.      Review of Systems   Constitutional:  Negative for chills, fever and unexpected weight change.   HENT:  Negative for hearing loss, nosebleeds and sore throat.    Eyes:  Negative for pain, redness and visual disturbance.   Respiratory:  Negative for cough, shortness of breath and wheezing.    Cardiovascular:  Negative for chest pain, palpitations and leg swelling.   Gastrointestinal:  Negative for abdominal pain, nausea and vomiting.   Endocrine: Negative for polyphagia and polyuria.   Genitourinary:  Negative for dysuria and hematuria.   Musculoskeletal:         See HPI   Skin:  Negative for rash and wound.   Neurological:  Negative for dizziness, numbness and headaches.   Psychiatric/Behavioral:  Negative for decreased concentration and suicidal ideas. The patient is not nervous/anxious.          Past Medical History:   Diagnosis Date    Basal cell carcinoma     basal cell skin cancer    Cervical radiculopathy     with  numbness right arm-chronic issue-pinched nerve    Colon polyp     Lateral epicondylitis 05/04/2005    Varicosities of leg        Past Surgical History:   Procedure Laterality Date    COLONOSCOPY      HERNIA REPAIR Left     inguinal    PA RPR 1ST INGUN HRNA AGE 5 YRS/> REDUCIBLE Left 4/9/2019    Procedure: REPAIR HERNIA INGUINAL;  Surgeon: Kentrell Rob MD;  Location: [facility];  Service: General    SKIN BIOPSY      multiple basal cell cancers removed-local anesthesia       Family History   Problem Relation Age of Onset    Lung cancer Father     Hypertension Father     Other Father         nicotine abuse    Cancer Father         lung    Heart attack Maternal Grandfather        Social History     Occupational History    Not on file   Tobacco Use    Smoking status: Never    Smokeless tobacco: Never   Vaping Use    Vaping status: Never Used   Substance and Sexual Activity    Alcohol use: Yes     Alcohol/week: 14.0 standard drinks of alcohol     Types: 14 Cans of beer per week    Drug use: Not Currently    Sexual activity: Not on file         Current Outpatient Medications:     acetaminophen (TYLENOL) 500 mg tablet, Take 500 mg by mouth every 6 (six) hours as needed for mild pain (Patient not taking: Reported on 8/11/2023), Disp: , Rfl:     cephalexin (KEFLEX) 500 mg capsule, Take 500 mg by mouth every 12 (twelve) hours (Patient not taking: Reported on 8/11/2023), Disp: , Rfl:     HYDROcodone-acetaminophen (NORCO) 5-325 mg per tablet, TAKE ONE TABLET BY MOUTH EVERY 12 HOURS AS NEEDED FOR PAIN MAXIMUM DAILY DOSE = TWO TABLETS (Patient not taking: Reported on 4/3/2025), Disp: , Rfl:     multivitamin (THERAGRAN) TABS, Take 1 tablet by mouth daily (Patient not taking: Reported on 8/11/2023), Disp: , Rfl:     naproxen sodium (ALEVE) 220 MG tablet, Take 1 tablet by mouth 3 (three) times a day as needed (Patient not taking: Reported on 8/11/2023), Disp: , Rfl:     sildenafil (Viagra) 100 mg tablet, Take 1 tablet (100 mg  total) by mouth daily as needed for erectile dysfunction (Patient not taking: Reported on 4/3/2025), Disp: 10 tablet, Rfl: 5    No Known Allergies    Objective:  There were no vitals filed for this visit.  Pain Score:   4      Ortho Exam    Physical Exam  Vitals reviewed.   Constitutional:       Appearance: He is well-developed.   HENT:      Head: Normocephalic and atraumatic.   Eyes:      Conjunctiva/sclera: Conjunctivae normal.      Pupils: Pupils are equal, round, and reactive to light.   Neck:        Comments: Negative Spurling's maneuver bilaterally.  Cardiovascular:      Rate and Rhythm: Normal rate.      Pulses: Normal pulses.   Pulmonary:      Effort: Pulmonary effort is normal. No respiratory distress.   Musculoskeletal:      Cervical back: Neck supple. Spinous process tenderness and muscular tenderness present. Decreased range of motion.      Comments: As noted in HPI   Skin:     General: Skin is warm and dry.   Neurological:      General: No focal deficit present.      Mental Status: He is alert and oriented to person, place, and time.   Psychiatric:         Mood and Affect: Mood normal.         Behavior: Behavior normal.             This document was created using speech voice recognition software.   Grammatical errors, random word insertions, pronoun errors, and incomplete sentences are an occasional consequence of this system due to software limitations, ambient noise, and hardware issues.   Any formal questions or concerns about content, text, or information contained within the body of this dictation should be directly addressed to the provider for clarification.

## 2025-04-15 ENCOUNTER — TELEPHONE (OUTPATIENT)
Age: 65
End: 2025-04-15

## 2025-04-15 NOTE — TELEPHONE ENCOUNTER
Caller: sammy    Doctor/Office: Dr Monique    Call regarding :  change appointment     Call was transferred to: SPA

## 2025-05-02 ENCOUNTER — OFFICE VISIT (OUTPATIENT)
Dept: PAIN MEDICINE | Facility: CLINIC | Age: 65
End: 2025-05-02
Attending: ORTHOPAEDIC SURGERY
Payer: COMMERCIAL

## 2025-05-02 VITALS — BODY MASS INDEX: 28.44 KG/M2 | HEIGHT: 72 IN | WEIGHT: 210 LBS

## 2025-05-02 DIAGNOSIS — M47.812 CERVICAL SPONDYLOSIS: Primary | ICD-10-CM

## 2025-05-02 DIAGNOSIS — M47.812 ARTHRITIS OF FACET JOINT OF CERVICAL SPINE: ICD-10-CM

## 2025-05-02 PROCEDURE — 99204 OFFICE O/P NEW MOD 45 MIN: CPT | Performed by: STUDENT IN AN ORGANIZED HEALTH CARE EDUCATION/TRAINING PROGRAM

## 2025-05-02 RX ORDER — DICLOFENAC SODIUM 75 MG/1
75 TABLET, DELAYED RELEASE ORAL 2 TIMES DAILY
Qty: 60 TABLET | Refills: 1 | Status: SHIPPED | OUTPATIENT
Start: 2025-05-02

## 2025-05-02 NOTE — PROGRESS NOTES
Assessment:  1. Cervical spondylosis    2. 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 Please leave your jewelry and valuables at home, wedding rings are the exception. All patients will be required to change into a hospital gown and pants.  Street clothes are not permitted in the MRI.  Magnetic nail polish must be removed prior to arrival for your test. Please bring your insurance cards, a form of photo ID and a list of your medications with you. Arrive 15 minutes prior to your appointment time in order to register. Please bring any prior CT or MRI studies of this area that were not performed at a [facility].            To schedule this appointment, please contact Central Scheduling at [phone] or [phone].            Prior to your appointment, please make sure you complete the MRI Screening Form when you e-Check in for your appointment. This will be available starting 7 days before your appointment in [employer]. You may receive an e-mail with an activation code if you do not have a [employer] account. If you do not have access to a device, we will complete your screening at your appointment.     Reason for Exam:   persistent neck pain despite conservative measures     What is the patient's sedation requirement?:   No Sedation     Does the patient need medication for Claustrophobia? If yes, order medication at this point.:   No     Does the patient wear a life vest, have an implanted cardiac device, a stimulation device, a sleep apnea stimulator, or a breast tissue expansion device?:   No     Release to patient through [employer]:   Immediate       New Medications Ordered This Visit   Medications    diclofenac (VOLTAREN) 75 mg EC tablet     Sig: Take 1  tablet (75 mg total) by mouth 2 (two) times a day     Dispense:  60 tablet     Refill:  1       My impressions and treatment recommendations were discussed in detail with the patient, who verbalized understanding and had no further questions.    Francisco Javier is a very pleasant 64-year-old male who presents today for evaluation management of right-sided neck pain.  I independently interpreted the patient's cervical radiographs showing facet arthropathy at C2-3 and C3-4.  He also has degenerative endplate changes at C5-6 C6-7.  I do think the patient's pain is mechanical and facet mediated in nature with a component of myofascial pain.  I provided him with a prescription for diclofenac to be taken in favor of other NSAIDs.    Given failure to improve with conservative care including 6 weeks of physical therapy as well as over-the-counter analgesics, I do think he would benefit from a cervical MRI to further evaluate for pathology that may be amenable to injection therapy.    I will have him follow-up in 4 weeks to see how he is progressing with his oral diclofenac and to review his MRI with him.      Discharge instructions were provided. I personally saw and examined the patient and I agree with the above discussed plan of care.    I have spent a total time of 41 minutes in caring for this patient on the day of the visit/encounter including Diagnostic results, Prognosis, Risks and benefits of tx options, Instructions for management, Risk factor reductions, Impressions, Documenting in the medical record, Reviewing/placing orders in the medical record (including tests, medications, and/or procedures), and Obtaining or reviewing history  .     History of Present Illness:    Francisco Javier Payan is a 64 y.o. male who presents to [facility] for initial evaluation of the above stated pain complaints. The patient has a past medical and chronic pain history as outlined in the assessment section. He was referred by  Zeeshan Phipps, DO  [address], [address]  [place] [address] .    64-year-old male with past medical history of skin cancer presents today for evaluation management of neck pain.  He denies any specific inciting event.  The pain is rated as mild and currently 4 out of 10.  He denies any activities of daily activities.  The pain is constant worse in the morning associated with sharpness.  Pain is located in the right upper trapezius area.  His pain does not change with any activities.  He has tried physical therapy and therapeutic exercise and moderate relief.  He is currently taking ibuprofen which is helpful.  He presents today for further evaluation.    Review of Systems:    Review of Systems   Constitutional:  Negative for fever and unexpected weight change.   HENT:  Negative for trouble swallowing.    Eyes:  Negative for visual disturbance.   Respiratory:  Negative for chest tightness, shortness of breath and wheezing.    Cardiovascular:  Negative for chest pain and palpitations.   Gastrointestinal:  Negative for constipation, diarrhea, nausea and vomiting.   Endocrine: Negative for cold intolerance, heat intolerance and polydipsia.   Genitourinary:  Negative for difficulty urinating and frequency.   Musculoskeletal:  Positive for neck pain and neck stiffness. Negative for arthralgias, back pain, gait problem, joint swelling and myalgias.   Skin:  Negative for rash.   Neurological:  Positive for numbness and headaches. Negative for dizziness, seizures, syncope, weakness and light-headedness.   Hematological:  Does not bruise/bleed easily.   Psychiatric/Behavioral:  Negative for dysphoric mood and sleep disturbance.    All other systems reviewed and are negative.          Past Medical History:   Diagnosis Date    Basal cell carcinoma     basal cell skin cancer    Cervical radiculopathy     with numbness right arm-chronic issue-pinched nerve    Colon polyp     Lateral  epicondylitis 05/04/2005    Varicosities of leg        Past Surgical History:   Procedure Laterality Date    COLONOSCOPY      HERNIA REPAIR Left     inguinal    IN RPR 1ST INGUN HRNA AGE 6 YRS/> REDUCIBLE Left 4/9/2019    Procedure: REPAIR HERNIA INGUINAL;  Surgeon: Kentrell Rob MD;  Location: [facility];  Service: General    SKIN BIOPSY      multiple basal cell cancers removed-local anesthesia       Family History   Problem Relation Age of Onset    Lung cancer Father     Hypertension Father     Other Father         nicotine abuse    Cancer Father         lung    Heart attack Maternal Grandfather        Social History     Occupational History    Not on file   Tobacco Use    Smoking status: Never    Smokeless tobacco: Never   Vaping Use    Vaping status: Never Used   Substance and Sexual Activity    Alcohol use: Yes     Alcohol/week: 14.0 standard drinks of alcohol     Types: 14 Cans of beer per week    Drug use: Not Currently    Sexual activity: Not on file         Current Outpatient Medications:     diclofenac (VOLTAREN) 75 mg EC tablet, Take 1 tablet (75 mg total) by mouth 2 (two) times a day, Disp: 60 tablet, Rfl: 1    acetaminophen (TYLENOL) 500 mg tablet, Take 500 mg by mouth every 6 (six) hours as needed for mild pain (Patient not taking: Reported on 8/11/2023), Disp: , Rfl:     cephalexin (KEFLEX) 500 mg capsule, Take 500 mg by mouth every 12 (twelve) hours (Patient not taking: Reported on 8/11/2023), Disp: , Rfl:     HYDROcodone-acetaminophen (NORCO) 5-325 mg per tablet, TAKE ONE TABLET BY MOUTH EVERY 12 HOURS AS NEEDED FOR PAIN MAXIMUM DAILY DOSE = TWO TABLETS (Patient not taking: Reported on 8/11/2023), Disp: , Rfl:     multivitamin (THERAGRAN) TABS, Take 1 tablet by mouth daily (Patient not taking: Reported on 8/11/2023), Disp: , Rfl:     naproxen sodium (ALEVE) 220 MG tablet, Take 1 tablet by mouth 3 (three) times a day as needed (Patient not taking: Reported on 8/11/2023), Disp: , Rfl:     sildenafil  (Viagra) 100 mg tablet, Take 1 tablet (100 mg total) by mouth daily as needed for erectile dysfunction (Patient not taking: Reported on 8/11/2023), Disp: 10 tablet, Rfl: 5    No Known Allergies    Physical Exam:    Ht 6' (1.829 m)   Wt 95.3 kg (210 lb)   BMI 28.48 kg/m²     Constitutional: normal, well developed, well nourished, alert, in no distress and non-toxic and no overt pain behavior.  Eyes: anicteric  HEENT: grossly intact  Neck: supple, symmetric, trachea midline and no masses   Pulmonary:even and unlabored  Cardiovascular:No edema or pitting edema present  Skin:Normal without rashes or lesions and well hydrated  Psychiatric:Mood and affect appropriate  Neurologic:Cranial Nerves II-XII grossly intact  Musculoskeletal: Range of motion of the cervical spine limited with right rotation.  Tenderness to cervical paraspinals and upper trapezius with taut myofascial bands on the right.  Positive facet loading in the cervical spine on the right.  Strength, sensation, reflexes preserved in the upper limbs.    Imaging       XR spine cervical 2 or 3 vw injury  Status: Final result     PACS Images - GE     Show images for XR spine cervical 2 or 3 vw injury  PACS Images - Sectra     Show images for XR spine cervical 2 or 3 vw injury  Study Result    Narrative & Impression         CERVICAL SPINE     INDICATION:   Cervicalgia.      COMPARISON:  None.     VIEWS:  XR SPINE CERVICAL 2 OR 3 VW INJURY   Images: 2     FINDINGS:     No acute fracture or subluxation.      Anatomic alignment.     There is moderate disc space narrowing with spondylosis in the mid to lower cervical spine worse at C5-6 and C6-7. There is mild facet disease as well..      Normal prevertebral soft tissues.       Clear lung apices.     IMPRESSION:        Moderate spondylosis and mild facet disease predominantly in the lower cervical spine     Electronically signed: 02/20/2025 03:45 PM Edward Guerrero MD     Imaging    XR spine cervical 2 or 3 vw  "injury (Order: [id number]) - 2/20/2025    Result History    XR spine cervical 2 or 3 vw injury (Order #[id number]) on 2/20/2025 - Order Result History Report    Order Report     Order Details  Result Information    Status Priority Source   Final result (2/20/2025  3:45 PM) Routine      Reason for Exam    Dx: Neck pain [M54.2 (ICD-10-CM)]     All Reviewers List    Zeeshan Phipps DO on 2/20/2025  4:34 PM       XR spine cervical 2 or 3 vw injury: Patient Communication     Add Comments   Add Notifications      Signed by    Signed Date/Time  Phone Pager   PERCY LEMONS 2/20/2025 15:45 [phone]      Exam Information    Status Exam Begun  Exam Ended  Performing Tech   Final [99] 2/20/2025 10:55 2/20/2025 10:58 Ivett Roberson     Questionnaire        Question Answer Comment   1. When should the test be performed?           End Exam      [facility] END VERIFY IMAGES IN PACS    Question Answer Comment   1. Have you verified the patient's images in PACS? Yes    2. Why have the images not been verified in PACS?           IMAGING END EXAM XRAY    Question Answer Comment   1. Script Info/History/Comments: Neck pain    2. Any additional comments the Radiologist needs to know? pt states he cannot recall injuring his neck.    3. Was the patient shielded? No    4. Was fluoro used? No    5. Fluoro time? Please type \"MINUTES\" or \"SECONDS\" following your time.     6. Were all the images in this exam within the acceptable exposure index range as posted? Yes    7. If No, provide additional information why (ie which view or position, fixed or AEC, wall/table/tabletop, etc)     8. Number of images sent to PACS: 2    9. Was jewelry removed from the patient? No    10. Jewelry removed:           PATIENT EDUCATION    Question Answer Comment   1. Was the patient educated? Yes    2. Why was the patient not educated?          External Results Report    Open External Results Report    Encounter    View Encounter           "   Sedation Documentation Timeline (2/20/2025 00:00 to 2/20/2025 10:58)    An end event has not been filed for the most recent intervention. Due to this intervention’s length, all data may not appear below. To see all data, file an end event.  2/20/2025 2/20/2025 Event By   10:53:36 Discharge Orders Placed BK   Imaging  - XR spine cervical 2 or 3 vw injury         10:54:50 Discharge Order Performed    XR spine cervical 2 or 3 vw injury  - ID: [id number]           Pre-op Summary    Pre-op           Recovery Summary    Recovery             Routing History    None         MRI cervical spine wo contrast    (Results Pending)       Orders Placed This Encounter   Procedures    MRI cervical spine wo contrast

## 2025-05-08 ENCOUNTER — HOSPITAL ENCOUNTER (OUTPATIENT)
Dept: RADIOLOGY | Facility: HOSPITAL | Age: 65
Discharge: HOME | End: 2025-05-08
Attending: STUDENT IN AN ORGANIZED HEALTH CARE EDUCATION/TRAINING PROGRAM
Payer: COMMERCIAL

## 2025-05-08 DIAGNOSIS — M47.812 CERVICAL SPONDYLOSIS: ICD-10-CM

## 2025-05-08 DIAGNOSIS — M47.812 ARTHRITIS OF FACET JOINT OF CERVICAL SPINE: ICD-10-CM

## 2025-05-08 PROCEDURE — 72141 MRI NECK SPINE W/O DYE: CPT

## 2025-05-30 ENCOUNTER — OFFICE VISIT (OUTPATIENT)
Dept: PAIN MEDICINE | Facility: CLINIC | Age: 65
End: 2025-05-30
Payer: COMMERCIAL

## 2025-05-30 VITALS — HEIGHT: 72 IN | BODY MASS INDEX: 28.44 KG/M2 | WEIGHT: 210 LBS

## 2025-05-30 DIAGNOSIS — M79.18 MYOFASCIAL PAIN SYNDROME: Primary | ICD-10-CM

## 2025-05-30 DIAGNOSIS — M47.812 CERVICAL SPONDYLOSIS: ICD-10-CM

## 2025-05-30 PROCEDURE — 99214 OFFICE O/P EST MOD 30 MIN: CPT | Performed by: STUDENT IN AN ORGANIZED HEALTH CARE EDUCATION/TRAINING PROGRAM

## 2025-05-30 NOTE — PROGRESS NOTES
Name: Francisco Javier Payan      : 1960      MRN: [id number]  Encounter Provider: Harpal Monique DO  Encounter Date: 2025   Encounter department: [facility] [place]  :  Assessment & Plan  Myofascial pain syndrome         Cervical spondylosis         Francisco Javier presents today for follow-up regarding his right-sided neck pain.  He has had great relief of his symptoms with conservative measures.  For now I recommend he continue with conservative care.  Discussed he could follow-up as needed should his pain worsen or fail to improve.  We did review his cervical MRI which I independently interpreted showing multilevel spondylosis with disc bulging and facet arthropathy.         My impressions and treatment recommendations were discussed in detail with the patient who verbalized understanding and had no further questions.  Discharge instructions were provided. I personally saw and examined the patient and I agree with the above discussed plan of care. Follow-up is planned as needed or sooner as warranted. The patient was advised to contact the office should their symptoms worsen in the interim.    History of Present Illness     Francisco Javier Payan is a 64 y.o. male who presents for a follow up office visit in regards to Neck Pain.  Francisco Javier presents today for follow-up regarding his neck pain.  He reports since his last visit his pain is improved.  He is having minimal pain in his right cervical paraspinals.  He continues to use exercise and physical therapy.  He presents today for further evaluation.    Opioid contract date    Last UDS Date: No results in last 5 years                    last taken on    Review of Systems   Respiratory:  Negative for chest tightness and shortness of breath.    Cardiovascular:  Negative for chest pain.   Gastrointestinal:  Negative for constipation, diarrhea, nausea and vomiting.   Musculoskeletal:  Negative for arthralgias, back pain, gait problem, joint swelling, myalgias, neck  pain and neck stiffness.   Skin:  Negative for rash.   Neurological:  Positive for numbness. Negative for dizziness, seizures, weakness, light-headedness and headaches.   Psychiatric/Behavioral:  Negative for sleep disturbance.    All other systems reviewed and are negative.      Medical History Reviewed by provider this encounter:     .  Past Medical History   Past Medical History[1]  Past Surgical History[2]  Family History[3]   reports that he has never smoked. He has never used smokeless tobacco. He reports current alcohol use of about 14.0 standard drinks of alcohol per week. He reports that he does not currently use drugs.  Current Outpatient Medications   Medication Instructions    acetaminophen (TYLENOL) 500 mg, Every 6 hours PRN    cephalexin (KEFLEX) 500 mg, Every 12 hours    diclofenac (VOLTAREN) 75 mg, Oral, 2 times daily    HYDROcodone-acetaminophen (NORCO) 5-325 mg per tablet TAKE ONE TABLET BY MOUTH EVERY 12 HOURS AS NEEDED FOR PAIN MAXIMUM DAILY DOSE = TWO TABLETS    multivitamin (THERAGRAN) TABS 1 tablet, Daily    naproxen sodium (ALEVE) 220 MG tablet 1 tablet, 3 times daily PRN    sildenafil (VIAGRA) 100 mg, Oral, Daily PRN   Allergies[4]   Medications Ordered Prior to Encounter[5]   Social History[6]     Objective   Ht 6' (1.829 m)   Wt 95.3 kg (210 lb)   BMI 28.48 kg/m²      Pain Score:   1  Physical Exam  Constitutional: normal, well developed, well nourished, alert, in no distress and non-toxic and no overt pain behavior.  Eyes: anicteric  HEENT: grossly intact  Neck: supple, symmetric, trachea midline and no masses   Pulmonary:even and unlabored  Cardiovascular:No edema or pitting edema present  Skin:Normal without rashes or lesions and well hydrated  Psychiatric:Mood and affect appropriate  Neurologic:Cranial Nerves II-XII grossly intact  Musculoskeletal: normal        Administrative Statements   I have spent a total time of 31 minutes in caring for this patient on the day of the  visit/encounter including Diagnostic results, Prognosis, Risks and benefits of tx options, Instructions for management, Impressions, Documenting in the medical record, Reviewing/placing orders in the medical record (including tests, medications, and/or procedures), and Obtaining or reviewing history  .       [1]   Past Medical History:  Diagnosis Date    Basal cell carcinoma     basal cell skin cancer    Cervical radiculopathy     with numbness right arm-chronic issue-pinched nerve    Colon polyp     Lateral epicondylitis 05/04/2005    Varicosities of leg    [2]   Past Surgical History:  Procedure Laterality Date    COLONOSCOPY      HERNIA REPAIR Left     inguinal    MS RPR 1ST INGUN HRNA AGE 6 YRS/> REDUCIBLE Left 4/9/2019    Procedure: REPAIR HERNIA INGUINAL;  Surgeon: Kentrell Rob MD;  Location: [facility];  Service: General    SKIN BIOPSY      multiple basal cell cancers removed-local anesthesia   [3]   Family History  Problem Relation Name Age of Onset    Lung cancer Father Dad     Hypertension Father Dad     Other Father Dad         nicotine abuse    Cancer Father Dad         lung    Heart attack Maternal Grandfather     [4] No Known Allergies  [5]   Current Outpatient Medications on File Prior to Visit   Medication Sig Dispense Refill    diclofenac (VOLTAREN) 75 mg EC tablet Take 1 tablet (75 mg total) by mouth 2 (two) times a day 60 tablet 1    acetaminophen (TYLENOL) 500 mg tablet Take 500 mg by mouth every 6 (six) hours as needed for mild pain (Patient not taking: Reported on 8/11/2023)      cephalexin (KEFLEX) 500 mg capsule Take 500 mg by mouth every 12 (twelve) hours (Patient not taking: Reported on 8/11/2023)      HYDROcodone-acetaminophen (NORCO) 5-325 mg per tablet TAKE ONE TABLET BY MOUTH EVERY 12 HOURS AS NEEDED FOR PAIN MAXIMUM DAILY DOSE = TWO TABLETS (Patient not taking: Reported on 5/30/2025)      multivitamin (THERAGRAN) TABS Take 1 tablet by mouth daily (Patient not taking: Reported on  8/11/2023)      naproxen sodium (ALEVE) 220 MG tablet Take 1 tablet by mouth 3 (three) times a day as needed (Patient not taking: Reported on 8/11/2023)      sildenafil (Viagra) 100 mg tablet Take 1 tablet (100 mg total) by mouth daily as needed for erectile dysfunction (Patient not taking: Reported on 5/30/2025) 10 tablet 5     No current facility-administered medications on file prior to visit.   [6]   Social History  Tobacco Use    Smoking status: Never    Smokeless tobacco: Never   Vaping Use    Vaping status: Never Used   Substance and Sexual Activity    Alcohol use: Yes     Alcohol/week: 14.0 standard drinks of alcohol     Types: 14 Cans of beer per week    Drug use: Not Currently

## (undated) DEVICE — SCD SEQUENTIAL COMPRESSION COMFORT SLEEVE MEDIUM KNEE LENGTH: Brand: KENDALL SCD

## (undated) DEVICE — BAG DECANTER

## (undated) DEVICE — SUT VICRYL 0 18 IN J906G

## (undated) DEVICE — GLOVE SRG BIOGEL 7

## (undated) DEVICE — TIBURON TRANSVERSE LAPAROTOMY SHEET: Brand: CONVERTORS

## (undated) DEVICE — SUT PROLENE 2-0 CT-2 30 IN 8411H

## (undated) DEVICE — SUT CHROMIC 0 CT 36 IN 914H

## (undated) DEVICE — SUT CHROMIC 2-0 CT-1 27 IN 811H

## (undated) DEVICE — FABRIC REINFORCED, SURGICAL GOWN, XL: Brand: CONVERTORS

## (undated) DEVICE — GLOVE INDICATOR PI UNDERGLOVE SZ 7.5 BLUE

## (undated) DEVICE — STRL PENROSE DRAIN 18" X 1/4": Brand: CARDINAL HEALTH

## (undated) DEVICE — PACK GENERAL LF

## (undated) DEVICE — SUT PDS II 0 CT-2 27 IN Z334H

## (undated) DEVICE — SUT VICRYL 3-0 18 IN J904T

## (undated) DEVICE — 3M™ TEGADERM™ TRANSPARENT FILM DRESSING FRAME STYLE, 1626W, 4 IN X 4-3/4 IN (10 CM X 12 CM), 50/CT 4CT/CASE: Brand: 3M™ TEGADERM™

## (undated) DEVICE — BASIC DOUBLE BASIN 2-LF: Brand: MEDLINE INDUSTRIES, INC.

## (undated) DEVICE — SUT MONOCRYL 4-0 PC-5 18 IN Y823G

## (undated) DEVICE — CHLORAPREP HI-LITE 26ML ORANGE